# Patient Record
Sex: MALE | Race: WHITE | NOT HISPANIC OR LATINO | Employment: STUDENT | URBAN - METROPOLITAN AREA
[De-identification: names, ages, dates, MRNs, and addresses within clinical notes are randomized per-mention and may not be internally consistent; named-entity substitution may affect disease eponyms.]

---
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
## 2022-04-11 ENCOUNTER — HISTORICAL (OUTPATIENT)
Dept: ADMINISTRATIVE | Facility: HOSPITAL | Age: 22
End: 2022-04-11
Payer: COMMERCIAL

## 2022-04-28 VITALS
DIASTOLIC BLOOD PRESSURE: 60 MMHG | SYSTOLIC BLOOD PRESSURE: 108 MMHG | WEIGHT: 194.69 LBS | HEIGHT: 77 IN | BODY MASS INDEX: 22.99 KG/M2

## 2022-07-31 PROBLEM — Z23 IMMUNIZATION DUE: Status: ACTIVE | Noted: 2022-07-31

## 2022-07-31 PROBLEM — Z00.00 ENCOUNTER FOR WELLNESS EXAMINATION IN ADULT: Status: ACTIVE | Noted: 2022-07-31

## 2022-08-01 PROBLEM — F90.9 ADHD: Status: ACTIVE | Noted: 2022-08-01

## 2022-08-16 ENCOUNTER — ATHLETIC TRAINING SESSION (OUTPATIENT)
Dept: SPORTS MEDICINE | Facility: CLINIC | Age: 22
End: 2022-08-16

## 2022-08-17 ENCOUNTER — OFFICE VISIT (OUTPATIENT)
Dept: SPORTS MEDICINE | Facility: CLINIC | Age: 22
End: 2022-08-17
Payer: COMMERCIAL

## 2022-08-17 ENCOUNTER — HOSPITAL ENCOUNTER (OUTPATIENT)
Dept: CARDIOLOGY | Facility: HOSPITAL | Age: 22
Discharge: HOME | End: 2022-08-17
Attending: ORTHOPAEDIC SURGERY
Payer: COMMERCIAL

## 2022-08-17 VITALS
WEIGHT: 209 LBS | DIASTOLIC BLOOD PRESSURE: 79 MMHG | BODY MASS INDEX: 25.45 KG/M2 | HEIGHT: 76 IN | SYSTOLIC BLOOD PRESSURE: 112 MMHG

## 2022-08-17 VITALS
HEART RATE: 71 BPM | DIASTOLIC BLOOD PRESSURE: 79 MMHG | BODY MASS INDEX: 25.45 KG/M2 | SYSTOLIC BLOOD PRESSURE: 112 MMHG | HEIGHT: 76 IN | WEIGHT: 209 LBS

## 2022-08-17 DIAGNOSIS — R06.09 DOE (DYSPNEA ON EXERTION): ICD-10-CM

## 2022-08-17 DIAGNOSIS — Z86.16 PERSONAL HISTORY OF COVID-19: Primary | ICD-10-CM

## 2022-08-17 DIAGNOSIS — Z09 ENCOUNTER FOR FOLLOW-UP EXAMINATION AFTER COMPLETED TREATMENT FOR CONDITIONS OTHER THAN MALIGNANT NEOPLASM: ICD-10-CM

## 2022-08-17 DIAGNOSIS — Z86.19 PERSONAL HISTORY OF OTHER INFECTIOUS AND PARASITIC DISEASE: ICD-10-CM

## 2022-08-17 DIAGNOSIS — Z86.16 PERSONAL HISTORY OF COVID-19: ICD-10-CM

## 2022-08-17 LAB
ASCENDING AORTA: 2.24 CM
AV INDEX (PROSTH): 1.12
AV MEAN GRADIENT: 2 MMHG
AV PEAK GRADIENT: 3 MMHG
AV VALVE AREA: 3.79 CM2
AV VELOCITY RATIO: 1.2
BSA FOR ECHO PROCEDURE: 2.25 M2
CV ECHO LV RWT: 0.32 CM
DOP CALC AO PEAK VEL: 0.83 M/S
DOP CALC AO VTI: 17.04 CM
DOP CALC LVOT AREA: 3.4 CM2
DOP CALC LVOT DIAMETER: 2.08 CM
DOP CALC LVOT PEAK VEL: 1 M/S
DOP CALC LVOT STROKE VOLUME: 64.63 CM3
DOP CALCLVOT PEAK VEL VTI: 19.03 CM
E WAVE DECELERATION TIME: 244.94 MSEC
E/A RATIO: 1.56
E/E' RATIO: 4.88 M/S
ECHO LV POSTERIOR WALL: 0.77 CM (ref 0.6–1.1)
EJECTION FRACTION: 60 %
FRACTIONAL SHORTENING: 21 % (ref 28–44)
INTERVENTRICULAR SEPTUM: 0.73 CM (ref 0.6–1.1)
IVRT: 59.94 MSEC
LA MAJOR: 4.16 CM
LA MINOR: 4.49 CM
LA WIDTH: 4.11 CM
LEFT ATRIUM SIZE: 3.81 CM
LEFT ATRIUM VOLUME INDEX MOD: 15.4 ML/M2
LEFT ATRIUM VOLUME INDEX: 25.4 ML/M2
LEFT ATRIUM VOLUME MOD: 34.79 CM3
LEFT ATRIUM VOLUME: 57.48 CM3
LEFT INTERNAL DIMENSION IN SYSTOLE: 3.74 CM (ref 2.1–4)
LEFT VENTRICLE DIASTOLIC VOLUME INDEX: 46.5 ML/M2
LEFT VENTRICLE DIASTOLIC VOLUME: 105.09 ML
LEFT VENTRICLE MASS INDEX: 51 G/M2
LEFT VENTRICLE SYSTOLIC VOLUME INDEX: 26.3 ML/M2
LEFT VENTRICLE SYSTOLIC VOLUME: 59.52 ML
LEFT VENTRICULAR INTERNAL DIMENSION IN DIASTOLE: 4.75 CM (ref 3.5–6)
LEFT VENTRICULAR MASS: 114.56 G
LV LATERAL E/E' RATIO: 4.07 M/S
LV SEPTAL E/E' RATIO: 6.1 M/S
MV A" WAVE DURATION": 8.28 MSEC
MV PEAK A VEL: 0.39 M/S
MV PEAK E VEL: 0.61 M/S
MV STENOSIS PRESSURE HALF TIME: 71.03 MS
MV VALVE AREA P 1/2 METHOD: 3.1 CM2
PISA TR MAX VEL: 2.78 M/S
PULM VEIN S/D RATIO: 0.82
PV PEAK D VEL: 0.44 M/S
PV PEAK S VEL: 0.36 M/S
RA MAJOR: 4.13 CM
RA PRESSURE: 3 MMHG
RA WIDTH: 4.11 CM
RV TISSUE DOPPLER FREE WALL SYSTOLIC VELOCITY 1 (APICAL 4 CHAMBER VIEW): 11.05 CM/S
SINUS: 2.85 CM
STJ: 2.38 CM
TDI LATERAL: 0.15 M/S
TDI SEPTAL: 0.1 M/S
TDI: 0.13 M/S
TR MAX PG: 31 MMHG
TRICUSPID ANNULAR PLANE SYSTOLIC EXCURSION: 1.47 CM
TV REST PULMONARY ARTERY PRESSURE: 34 MMHG

## 2022-08-17 PROCEDURE — 93010 ELECTROCARDIOGRAM REPORT: CPT | Mod: S$GLB,,, | Performed by: INTERNAL MEDICINE

## 2022-08-17 PROCEDURE — 3074F PR MOST RECENT SYSTOLIC BLOOD PRESSURE < 130 MM HG: ICD-10-PCS | Mod: CPTII,S$GLB,, | Performed by: ORTHOPAEDIC SURGERY

## 2022-08-17 PROCEDURE — 93010 EKG 12-LEAD: ICD-10-PCS | Mod: S$GLB,,, | Performed by: INTERNAL MEDICINE

## 2022-08-17 PROCEDURE — 93306 TTE W/DOPPLER COMPLETE: CPT | Mod: 26,,, | Performed by: INTERNAL MEDICINE

## 2022-08-17 PROCEDURE — 99999 PR PBB SHADOW E&M-EST. PATIENT-LVL III: CPT | Mod: PBBFAC,,, | Performed by: ORTHOPAEDIC SURGERY

## 2022-08-17 PROCEDURE — 93005 ELECTROCARDIOGRAM TRACING: CPT | Mod: S$GLB,,, | Performed by: ORTHOPAEDIC SURGERY

## 2022-08-17 PROCEDURE — 1159F MED LIST DOCD IN RCRD: CPT | Mod: CPTII,S$GLB,, | Performed by: ORTHOPAEDIC SURGERY

## 2022-08-17 PROCEDURE — 3074F SYST BP LT 130 MM HG: CPT | Mod: CPTII,S$GLB,, | Performed by: ORTHOPAEDIC SURGERY

## 2022-08-17 PROCEDURE — 3078F DIAST BP <80 MM HG: CPT | Mod: CPTII,S$GLB,, | Performed by: ORTHOPAEDIC SURGERY

## 2022-08-17 PROCEDURE — 3008F PR BODY MASS INDEX (BMI) DOCUMENTED: ICD-10-PCS | Mod: CPTII,S$GLB,, | Performed by: ORTHOPAEDIC SURGERY

## 2022-08-17 PROCEDURE — 1159F PR MEDICATION LIST DOCUMENTED IN MEDICAL RECORD: ICD-10-PCS | Mod: CPTII,S$GLB,, | Performed by: ORTHOPAEDIC SURGERY

## 2022-08-17 PROCEDURE — 93306 ECHO (CUPID ONLY): ICD-10-PCS | Mod: 26,,, | Performed by: INTERNAL MEDICINE

## 2022-08-17 PROCEDURE — 99999 PR PBB SHADOW E&M-EST. PATIENT-LVL III: ICD-10-PCS | Mod: PBBFAC,,, | Performed by: ORTHOPAEDIC SURGERY

## 2022-08-17 PROCEDURE — 3078F PR MOST RECENT DIASTOLIC BLOOD PRESSURE < 80 MM HG: ICD-10-PCS | Mod: CPTII,S$GLB,, | Performed by: ORTHOPAEDIC SURGERY

## 2022-08-17 PROCEDURE — 1160F RVW MEDS BY RX/DR IN RCRD: CPT | Mod: CPTII,S$GLB,, | Performed by: ORTHOPAEDIC SURGERY

## 2022-08-17 PROCEDURE — 1160F PR REVIEW ALL MEDS BY PRESCRIBER/CLIN PHARMACIST DOCUMENTED: ICD-10-PCS | Mod: CPTII,S$GLB,, | Performed by: ORTHOPAEDIC SURGERY

## 2022-08-17 PROCEDURE — 93306 TTE W/DOPPLER COMPLETE: CPT

## 2022-08-17 PROCEDURE — 93005 EKG 12-LEAD: ICD-10-PCS | Mod: S$GLB,,, | Performed by: ORTHOPAEDIC SURGERY

## 2022-08-17 PROCEDURE — 99214 PR OFFICE/OUTPT VISIT, EST, LEVL IV, 30-39 MIN: ICD-10-PCS | Mod: S$GLB,,, | Performed by: ORTHOPAEDIC SURGERY

## 2022-08-17 PROCEDURE — 3008F BODY MASS INDEX DOCD: CPT | Mod: CPTII,S$GLB,, | Performed by: ORTHOPAEDIC SURGERY

## 2022-08-17 PROCEDURE — 99214 OFFICE O/P EST MOD 30 MIN: CPT | Mod: S$GLB,,, | Performed by: ORTHOPAEDIC SURGERY

## 2022-08-17 NOTE — PROGRESS NOTES
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  Patient reports having moderate symptoms during his viral course.  Patient appreciates abnormal breathing while throwing. Patient reports paternal grandfather with history of heart surgery.  Patient denies any other known family history of cardiac conditions or early deaths.  Patient denies any known family history of respiratory conditions.  Patient reports personal history of arrhythmia. Patient denies any other personal cardiac or respiratory conditions.     Date of positive test: 6/17/22  Time in isolation: 5 days  Symptoms during viral course: fatigue, difficult breathing, chest pain, headache, body aches, congestion, sore throat, back pain, temperature  Hospitalization required?: no     REVIEW OF SYSTEMS:   Constitution: Patient denies fever or chills.  Eyes: Patient denies eye pain or vision changes.  CVS: Patient denies chest pain.  Lungs: Patient denies shortness of breath or cough.  Abdomen: Patient denies any stomach pain, nausea, vomiting, or diarrhea  Skin: Patient denies skin rash or itching.    Musculoskeletal: Patient denies recent injuries or trauma.  Neuro: Patient denies any numbness or tingling in upper or lower extremities.  Psych: Patient denies any current anxiety or nervousness.     PAST MEDICAL HISTORY:   Past Medical History:   Diagnosis Date    ADHD        PAST SURGICAL HISTORY:  Past Surgical History:   Procedure Laterality Date    ULNAR NERVE TRANSPOSITION          FAMILY HISTORY:  Family History   Problem Relation Age of Onset    Hypertension Mother     Hyperlipidemia Father     No Known Problems Sister     Cancer Maternal Grandfather     Hypertension Paternal Grandfather     Stroke Paternal Grandfather     Congenital heart disease Paternal Grandfather         hole in the heart    Arrhythmia Neg Hx     Early death Neg Hx     Heart attacks under age 50 Neg Hx      Pacemaker/defibrilator Neg Hx        SOCIAL HISTORY:  Social Connections: Not on file       MEDICATIONS:   No current outpatient medications on file.    ALLERGIES:   Review of patient's allergies indicates:  No Known Allergies     PHYSICAL EXAMINATION:  Vitals:    08/17/22 1015   BP: 112/79     Vitals signs and nursing note have been reviewed.  General: In no acute distress, well developed, well nourished, no diaphoresis  Eyes: EOM full and smooth, no eye redness or discharge  HENT: normocephalic and atraumatic, neck supple, trachea midline, no nasal discharge  Cardiovascular:  Regular rate and rhythm, S1 and S2 auscultated, no S3, no S4, no murmurs, no thrills, no JVD, no LE edema, bilateral and symmetric 2+ DP and radial pulses bilaterally  Lungs: respirations non-labored, no conversational dyspnea, CTABL, no wheezing, no rhonchi  Neuro: AAOx3, CN2-12 grossly intact  Skin: No rashes, warm and dry  Psychiatric: cooperative, pleasant, mood and affect appropriate for age       ECG: Sinus bradycardia with sinus arrhythmia, ST elevations consistent with early repolarization, enlarged T-waves from V4 to V6. No Q waves.      ASSESSMENT:    1. Personal history of COVID-19    2. Personal history of other infectious and parasitic disease    3. Encounter for follow-up examination after completed treatment for conditions other than malignant neoplasm    4. HAM (dyspnea on exertion)           PLAN:  1-3. History of COVID-19 - new event     -  Ray is a  for [employer]. Patient previously tested positive for COVID-19 on 6/17/22 and is here to obtain cardiac clearance prior to engaging in athletic activity due to possibility of myocarditis.  During the course of his COVID-19 infection, patient experienced Moderate symptoms. Patient denies known family history of cardiac conditions or early death.      - ECG done in the office today and was personally reviewed by me and with the patient.      - At this time the patient  is mostly asymptomatic, but there are ECG findings that warrant further evaluation. In my opinion it is not yet safe for the patient to start progressing weight lifting and cardiovascular training slowly and under the supervision of the athletic training staff. Education on myocarditis provided today.    - Echocardiogram has been ordered and return to play recommendations pending results.     - ATC has been informed and is on board with the plan.       Future planning includes -  If symptoms exacerbate during return to activity, referral to cardiology, possibly more cardiac testing and labs     All questions were answered to the best of my ability and all concerns were addressed at this time.     Follow up as needed for above. I will remain in close contact with the  to insure no symptoms occur when returning to exercise.        This note is dictated using the M*Modal Fluency Direct word recognition program. There are word recognition mistakes that are occasionally missed on review.    Total time spent face-to face with patient counseling or coordinating care including prognosis, differential diagnosis, risks and benefits of treatment, instructions, compliance risk reductions as well as non-face-to-face time personally spent reviewing medial record, medical documentation, and coordination of care.     EST MINUTES X   99469 10-19    17400 20-29    56443 30-39 X   99215 40-54    NEW     63965 15-29    95997 30-44    82850 45-59    26050 60-74    PHONE      5-10    69177 11-20    66958 21-30

## 2022-08-23 ENCOUNTER — ATHLETIC TRAINING SESSION (OUTPATIENT)
Dept: SPORTS MEDICINE | Facility: CLINIC | Age: 22
End: 2022-08-23

## 2022-10-06 ENCOUNTER — OFFICE VISIT (OUTPATIENT)
Dept: URGENT CARE | Facility: CLINIC | Age: 22
End: 2022-10-06
Payer: COMMERCIAL

## 2022-10-06 VITALS
WEIGHT: 206 LBS | DIASTOLIC BLOOD PRESSURE: 71 MMHG | SYSTOLIC BLOOD PRESSURE: 132 MMHG | HEART RATE: 66 BPM | OXYGEN SATURATION: 97 % | HEIGHT: 77 IN | RESPIRATION RATE: 20 BRPM | BODY MASS INDEX: 24.32 KG/M2 | TEMPERATURE: 99 F

## 2022-10-06 DIAGNOSIS — R11.2 NAUSEA AND VOMITING, UNSPECIFIED VOMITING TYPE: ICD-10-CM

## 2022-10-06 DIAGNOSIS — A08.4 VIRAL GASTROENTERITIS: Primary | ICD-10-CM

## 2022-10-06 PROCEDURE — 99213 OFFICE O/P EST LOW 20 MIN: CPT | Mod: 25,S$GLB,, | Performed by: NURSE PRACTITIONER

## 2022-10-06 PROCEDURE — 3075F PR MOST RECENT SYSTOLIC BLOOD PRESS GE 130-139MM HG: ICD-10-PCS | Mod: CPTII,S$GLB,, | Performed by: NURSE PRACTITIONER

## 2022-10-06 PROCEDURE — 3078F PR MOST RECENT DIASTOLIC BLOOD PRESSURE < 80 MM HG: ICD-10-PCS | Mod: CPTII,S$GLB,, | Performed by: NURSE PRACTITIONER

## 2022-10-06 PROCEDURE — 96372 THER/PROPH/DIAG INJ SC/IM: CPT | Mod: S$GLB,,, | Performed by: NURSE PRACTITIONER

## 2022-10-06 PROCEDURE — 99213 PR OFFICE/OUTPT VISIT, EST, LEVL III, 20-29 MIN: ICD-10-PCS | Mod: 25,S$GLB,, | Performed by: NURSE PRACTITIONER

## 2022-10-06 PROCEDURE — 1159F PR MEDICATION LIST DOCUMENTED IN MEDICAL RECORD: ICD-10-PCS | Mod: CPTII,S$GLB,, | Performed by: NURSE PRACTITIONER

## 2022-10-06 PROCEDURE — 3008F BODY MASS INDEX DOCD: CPT | Mod: CPTII,S$GLB,, | Performed by: NURSE PRACTITIONER

## 2022-10-06 PROCEDURE — 1159F MED LIST DOCD IN RCRD: CPT | Mod: CPTII,S$GLB,, | Performed by: NURSE PRACTITIONER

## 2022-10-06 PROCEDURE — 3075F SYST BP GE 130 - 139MM HG: CPT | Mod: CPTII,S$GLB,, | Performed by: NURSE PRACTITIONER

## 2022-10-06 PROCEDURE — 3008F PR BODY MASS INDEX (BMI) DOCUMENTED: ICD-10-PCS | Mod: CPTII,S$GLB,, | Performed by: NURSE PRACTITIONER

## 2022-10-06 PROCEDURE — 3078F DIAST BP <80 MM HG: CPT | Mod: CPTII,S$GLB,, | Performed by: NURSE PRACTITIONER

## 2022-10-06 PROCEDURE — 96372 PR INJECTION,THERAP/PROPH/DIAG2ST, IM OR SUBCUT: ICD-10-PCS | Mod: S$GLB,,, | Performed by: NURSE PRACTITIONER

## 2022-10-06 RX ORDER — ONDANSETRON 4 MG/1
4 TABLET, ORALLY DISINTEGRATING ORAL EVERY 8 HOURS PRN
Qty: 15 TABLET | Refills: 0 | Status: SHIPPED | OUTPATIENT
Start: 2022-10-06 | End: 2022-11-30

## 2022-10-06 RX ORDER — ONDANSETRON 2 MG/ML
4 INJECTION INTRAMUSCULAR; INTRAVENOUS
Status: COMPLETED | OUTPATIENT
Start: 2022-10-06 | End: 2022-10-06

## 2022-10-06 RX ADMIN — ONDANSETRON 4 MG: 2 INJECTION INTRAMUSCULAR; INTRAVENOUS at 10:10

## 2022-10-06 NOTE — PROGRESS NOTES
"Subjective:       Patient ID: Ray Lozano is a 22 y.o. male.    Vitals:  height is 6' 5" (1.956 m) and weight is 93.4 kg (206 lb). His oral temperature is 98.9 °F (37.2 °C). His blood pressure is 132/71 and his pulse is 66. His respiration is 20 and oxygen saturation is 97%.     Chief Complaint: Emesis    This is a 22 y.o. male who presents today with a chief complaint of  nausea and vomiting.  Patient said that he ate something yesterday and started vomiting afterwards. Patient says that he has been vomiting  about every 30 mins. Since 7:30 yesterday. Patient can't keep down any thing down.         Emesis   This is a new problem. The current episode started yesterday. The problem occurs 5 to 10 times per day. The problem has been unchanged. The emesis has an appearance of bile. There has been no fever. Associated symptoms include abdominal pain, chills and sweats. Risk factors include suspect food intake. Treatments tried: Zofran. The treatment provided no relief.     Constitution: Positive for chills.   Gastrointestinal:  Positive for abdominal pain and vomiting.     Objective:      Physical Exam   HENT:   Head: Normocephalic and atraumatic.   Ears:   Right Ear: Tympanic membrane, external ear and ear canal normal.   Left Ear: Tympanic membrane, external ear and ear canal normal.   Nose: Nose normal.   Pulmonary/Chest: Effort normal.   Abdominal: Normal appearance and bowel sounds are normal. He exhibits no distension and no mass. Soft. There is no abdominal tenderness. There is no rebound, no guarding, no left CVA tenderness and no right CVA tenderness. No hernia.   Neurological: He is alert.   Skin: Capillary refill takes less than 2 seconds.   Nursing note and vitals reviewed.        Assessment:       1. Viral gastroenteritis    2. Nausea and vomiting, unspecified vomiting type          Plan:         Viral gastroenteritis    Nausea and vomiting, unspecified vomiting type  -     ondansetron injection 4 " mg  -     ondansetron (ZOFRAN-ODT) 4 MG TbDL; Take 1 tablet (4 mg total) by mouth every 8 (eight) hours as needed (nausea and vomiting).  Dispense: 15 tablet; Refill: 0               Patient Instructions   Nausea & Vomiting  If your condition worsens or fails to improve we recommend that you receive another evaluation at the ER immediately or contact your PCP to discuss your concerns or return here. You must understand that you've received an urgent care treatment only and that you may be released before all your medical problems are known or treated. You the patient will arrange for followup care as instructed.   Use prescribed anti-nausea medicine as needed and prescribed   Increase fluids and rest is important   Start off with liquid diet and progress as tolerated (see below)  Water and clear liquids are important so you do not get dehydrated. Drink a small amount at a time.  Do not force yourself to eat, especially if you have cramps, vomiting, or diarrhea. When you finally decide to start eating, do not eat large amounts at a time, even if you are hungry.  If you eat, avoid fatty, greasy, spicy, or fried foods.  Do not eat dairy products if you have diarrhea; they can make the diarrhea worse  Watch for any increase pain, fever, localized pain to right lower abdomen or continued vomiting or diarrhea.

## 2022-10-06 NOTE — PATIENT INSTRUCTIONS
Nausea & Vomiting  If your condition worsens or fails to improve we recommend that you receive another evaluation at the ER immediately or contact your PCP to discuss your concerns or return here. You must understand that you've received an urgent care treatment only and that you may be released before all your medical problems are known or treated. You the patient will arrange for followup care as instructed.   Use prescribed anti-nausea medicine as needed and prescribed   Increase fluids and rest is important   Start off with liquid diet and progress as tolerated (see below)  Water and clear liquids are important so you do not get dehydrated. Drink a small amount at a time.  Do not force yourself to eat, especially if you have cramps, vomiting, or diarrhea. When you finally decide to start eating, do not eat large amounts at a time, even if you are hungry.  If you eat, avoid fatty, greasy, spicy, or fried foods.  Do not eat dairy products if you have diarrhea; they can make the diarrhea worse  Watch for any increase pain, fever, localized pain to right lower abdomen or continued vomiting or diarrhea.

## 2022-10-13 ENCOUNTER — ATHLETIC TRAINING SESSION (OUTPATIENT)
Dept: SPORTS MEDICINE | Facility: CLINIC | Age: 22
End: 2022-10-13
Payer: COMMERCIAL

## 2022-10-24 NOTE — PROGRESS NOTES
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 He reports he went to the ER last night because he was feeling so sick and couldn't keep anything down. He reports he was diagnosed there with gastroenteritis and colitis. He was given medicine to take to relieve symptoms, but he still is struggling to hold anything down.      Plan     Plan to continue meds and hold from practice until he starts feeling better. SA reports feeling very weak and fatigued, so he will need to build into practice again once he feels better. Athlete is cleared to come to practice, but shouldn't participate until he starts feeling stronger and more fueled.         Pablito REESE, LAT, ATC, NEYMAR    [employer] [facility]

## 2022-10-25 NOTE — PROGRESS NOTES
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    ATC administered fascial cupping for the patient.    Patient Reports Relief of Symptoms After Treatment: Yes    Plan to treat as needed.        LEONARD Barrios, ATC, [facility]    [employer] [facility]

## 2022-10-25 NOTE — PROGRESS NOTES
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    ATC administered fascial cupping for the patient.    Patient Reports Relief of Symptoms After Treatment: Yes    Plan to treat as needed.         LEONARD Barrios, ATC, [facility]    [employer] [facility]

## 2022-10-31 ENCOUNTER — ATHLETIC TRAINING SESSION (OUTPATIENT)
Dept: SPORTS MEDICINE | Facility: CLINIC | Age: 22
End: 2022-10-31

## 2022-10-31 PROBLEM — Z00.00 ENCOUNTER FOR WELLNESS EXAMINATION IN ADULT: Status: RESOLVED | Noted: 2022-07-31 | Resolved: 2022-10-31

## 2022-10-31 NOTE — PROGRESS NOTES
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    ATC administered fascial cupping for the patient.    Patient Reports Relief of Symptoms After Treatment: Yes    Plan to treat as needed.       Pablito REESE, LEONARD, ATC, [facility]    [employer] [facility] [place]

## 2022-11-30 PROBLEM — R11.15 CYCLICAL VOMITING: Status: ACTIVE | Noted: 2022-11-30

## 2022-11-30 PROBLEM — R63.4 WEIGHT LOSS: Status: ACTIVE | Noted: 2022-11-30

## 2022-11-30 PROCEDURE — 83690 ASSAY OF LIPASE: CPT | Performed by: FAMILY MEDICINE

## 2022-12-28 ENCOUNTER — LAB VISIT (OUTPATIENT)
Dept: LAB | Facility: HOSPITAL | Age: 22
End: 2022-12-28
Attending: STUDENT IN AN ORGANIZED HEALTH CARE EDUCATION/TRAINING PROGRAM
Payer: COMMERCIAL

## 2022-12-28 DIAGNOSIS — R11.0 NAUSEA: Primary | ICD-10-CM

## 2022-12-28 LAB
ERYTHROCYTE [DISTWIDTH] IN BLOOD BY AUTOMATED COUNT: 12.5 % (ref 11.6–14.4)
HCT VFR BLD AUTO: 39.7 % (ref 42–52)
HGB BLD-MCNC: 13.3 GM/DL (ref 14–18)
MCH RBC QN AUTO: 31.3 PG
MCHC RBC AUTO-ENTMCNC: 33.5 MG/DL (ref 33–36)
MCV RBC AUTO: 93.4 FL (ref 80–94)
NRBC BLD AUTO-RTO: 0 % (ref 0–1)
PLATELET # BLD AUTO: 268 X10(3)/MCL (ref 140–371)
PMV BLD AUTO: 9.7 FL (ref 9.4–12.4)
RBC # BLD AUTO: 4.25 X10(6)/MCL (ref 4.7–6.1)
WBC # SPEC AUTO: 6.8 X10(3)/MCL (ref 4.5–11.5)

## 2022-12-28 PROCEDURE — 85027 COMPLETE CBC AUTOMATED: CPT

## 2022-12-28 PROCEDURE — 36415 COLL VENOUS BLD VENIPUNCTURE: CPT

## 2023-02-17 ENCOUNTER — ATHLETIC TRAINING SESSION (OUTPATIENT)
Dept: SPORTS MEDICINE | Facility: CLINIC | Age: 23
End: 2023-02-17
Payer: COMMERCIAL

## 2023-03-03 NOTE — PROGRESS NOTES
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 He reports feeling pretty bad again overall.    Plan     Plan to continue pushing hydration, sleep, Pepto for symptoms, and light, dry foods when he can hold it down. Plan to follow up with his gastro doctor once we return to [facility].  ______________________________________________________________________    Visit Date: 2/18/2023    Affected Area: Illness  Initial Injury Date: 2/16/2023      Subjective     Student-athlete reports: That he is feeling much better, just empty and weak. He reports he is nervous to try and eat a lot to avoid getting sick again.    Reports he threw a light bullpen to keep his arm moving and it went okay.    Plan     Plan to continue pushing hydration, sleep, Pepto for symptoms, and light, dry foods when he can hold it down. Will continue to monitor, but he will not play this weekend.   ______________________________________________________________________    Visit Date: 2/17/2023    Affected Area: Illness  Initial Injury Date: 2/16/2023      Subjective     Student-athlete reports: That last night, he started feeling sick on the bus. He reports he thinks it was triggered after he at a spicy sandwich from [employer]. He admits he has been vomiting the whole bus ride, much of last night and this morning.      He reports in the afternoon that he feels a little bit better, but not great. He hasn't thrown up in several hours, but still feels nauseous and weak.      Plan     Plan to continue pushing hydration, sleep, Pepto for symptoms, and light,  dry foods when he can hold it down. Will continue to monitor, but he will not play this weekend.         Pablito REESE, LAT, ATC, NEYMAR    [employer] [facility] [place]

## 2023-03-24 ENCOUNTER — ATHLETIC TRAINING SESSION (OUTPATIENT)
Dept: SPORTS MEDICINE | Facility: CLINIC | Age: 23
End: 2023-03-24
Payer: COMMERCIAL

## 2023-03-24 NOTE — PROGRESS NOTES
[facility]       Weekly Treatment Log       Name: Ray Lozano  Clinic Number: [id number]  Sport: Men's Baseball    ___________________________________________________________    Visit Date: 3/23/2023    Reason For Visit: Soreness/Recovery  Affected Area: Right Shoulder      Notes     Student-athlete reports soreness in his right shoulder after throwing on 3/21 and the bus today.    ATC administered fascial cupping for the patient.    Patient Reports Relief of Symptoms After Treatment: Yes    Plan to treat as needed         Pablito REESE, LEONARD, ATC, [facility]    [employer] [facility]

## 2023-08-24 ENCOUNTER — OFFICE VISIT (OUTPATIENT)
Dept: SPORTS MEDICINE | Facility: CLINIC | Age: 23
End: 2023-08-24
Payer: COMMERCIAL

## 2023-08-24 ENCOUNTER — LAB VISIT (OUTPATIENT)
Dept: LAB | Facility: HOSPITAL | Age: 23
End: 2023-08-24
Attending: ORTHOPAEDIC SURGERY
Payer: COMMERCIAL

## 2023-08-24 VITALS
HEIGHT: 77 IN | DIASTOLIC BLOOD PRESSURE: 70 MMHG | SYSTOLIC BLOOD PRESSURE: 110 MMHG | WEIGHT: 186 LBS | BODY MASS INDEX: 21.96 KG/M2

## 2023-08-24 DIAGNOSIS — R11.2 NAUSEA AND VOMITING, UNSPECIFIED VOMITING TYPE: ICD-10-CM

## 2023-08-24 DIAGNOSIS — R53.83 FATIGUE, UNSPECIFIED TYPE: ICD-10-CM

## 2023-08-24 DIAGNOSIS — R11.2 NAUSEA AND VOMITING, UNSPECIFIED VOMITING TYPE: Primary | ICD-10-CM

## 2023-08-24 LAB
ALBUMIN SERPL BCP-MCNC: 4.2 G/DL (ref 3.5–5.2)
ALP SERPL-CCNC: 59 U/L (ref 55–135)
ALT SERPL W/O P-5'-P-CCNC: 16 U/L (ref 10–44)
AMYLASE SERPL-CCNC: 54 U/L (ref 20–110)
ANION GAP SERPL CALC-SCNC: 12 MMOL/L (ref 8–16)
AST SERPL-CCNC: 15 U/L (ref 10–40)
BASOPHILS # BLD AUTO: 0.05 K/UL (ref 0–0.2)
BASOPHILS NFR BLD: 0.9 % (ref 0–1.9)
BILIRUB SERPL-MCNC: 0.5 MG/DL (ref 0.1–1)
BUN SERPL-MCNC: 12 MG/DL (ref 6–20)
CALCIUM SERPL-MCNC: 9.5 MG/DL (ref 8.7–10.5)
CHLORIDE SERPL-SCNC: 107 MMOL/L (ref 95–110)
CO2 SERPL-SCNC: 23 MMOL/L (ref 23–29)
CREAT SERPL-MCNC: 1.4 MG/DL (ref 0.5–1.4)
DIFFERENTIAL METHOD: ABNORMAL
EOSINOPHIL # BLD AUTO: 0.2 K/UL (ref 0–0.5)
EOSINOPHIL NFR BLD: 4 % (ref 0–8)
ERYTHROCYTE [DISTWIDTH] IN BLOOD BY AUTOMATED COUNT: 12.3 % (ref 11.5–14.5)
EST. GFR  (NO RACE VARIABLE): >60 ML/MIN/1.73 M^2
FERRITIN SERPL-MCNC: 224 NG/ML (ref 20–300)
GLUCOSE SERPL-MCNC: 92 MG/DL (ref 70–110)
HCT VFR BLD AUTO: 40 % (ref 40–54)
HGB BLD-MCNC: 13.4 G/DL (ref 14–18)
IMM GRANULOCYTES # BLD AUTO: 0.02 K/UL (ref 0–0.04)
IMM GRANULOCYTES NFR BLD AUTO: 0.4 % (ref 0–0.5)
IRON SERPL-MCNC: 93 UG/DL (ref 45–160)
LYMPHOCYTES # BLD AUTO: 2.1 K/UL (ref 1–4.8)
LYMPHOCYTES NFR BLD: 37.7 % (ref 18–48)
MCH RBC QN AUTO: 31.6 PG (ref 27–31)
MCHC RBC AUTO-ENTMCNC: 33.5 G/DL (ref 32–36)
MCV RBC AUTO: 94 FL (ref 82–98)
MONOCYTES # BLD AUTO: 0.5 K/UL (ref 0.3–1)
MONOCYTES NFR BLD: 8.2 % (ref 4–15)
NEUTROPHILS # BLD AUTO: 2.7 K/UL (ref 1.8–7.7)
NEUTROPHILS NFR BLD: 48.8 % (ref 38–73)
NRBC BLD-RTO: 0 /100 WBC
PLATELET # BLD AUTO: 266 K/UL (ref 150–450)
PMV BLD AUTO: 9.6 FL (ref 9.2–12.9)
POTASSIUM SERPL-SCNC: 4.2 MMOL/L (ref 3.5–5.1)
PROT SERPL-MCNC: 6.8 G/DL (ref 6–8.4)
RBC # BLD AUTO: 4.24 M/UL (ref 4.6–6.2)
SATURATED IRON: 27 % (ref 20–50)
SODIUM SERPL-SCNC: 142 MMOL/L (ref 136–145)
TOTAL IRON BINDING CAPACITY: 348 UG/DL (ref 250–450)
TRANSFERRIN SERPL-MCNC: 235 MG/DL (ref 200–375)
WBC # BLD AUTO: 5.52 K/UL (ref 3.9–12.7)

## 2023-08-24 PROCEDURE — 84466 ASSAY OF TRANSFERRIN: CPT | Performed by: ORTHOPAEDIC SURGERY

## 2023-08-24 PROCEDURE — 3008F BODY MASS INDEX DOCD: CPT | Mod: CPTII,S$GLB,, | Performed by: ORTHOPAEDIC SURGERY

## 2023-08-24 PROCEDURE — 82728 ASSAY OF FERRITIN: CPT | Performed by: ORTHOPAEDIC SURGERY

## 2023-08-24 PROCEDURE — 85025 COMPLETE CBC W/AUTO DIFF WBC: CPT | Performed by: ORTHOPAEDIC SURGERY

## 2023-08-24 PROCEDURE — 3078F PR MOST RECENT DIASTOLIC BLOOD PRESSURE < 80 MM HG: ICD-10-PCS | Mod: CPTII,S$GLB,, | Performed by: ORTHOPAEDIC SURGERY

## 2023-08-24 PROCEDURE — 3008F PR BODY MASS INDEX (BMI) DOCUMENTED: ICD-10-PCS | Mod: CPTII,S$GLB,, | Performed by: ORTHOPAEDIC SURGERY

## 2023-08-24 PROCEDURE — 99999 PR PBB SHADOW E&M-EST. PATIENT-LVL III: ICD-10-PCS | Mod: PBBFAC,,, | Performed by: ORTHOPAEDIC SURGERY

## 2023-08-24 PROCEDURE — 83540 ASSAY OF IRON: CPT | Performed by: ORTHOPAEDIC SURGERY

## 2023-08-24 PROCEDURE — 1159F MED LIST DOCD IN RCRD: CPT | Mod: CPTII,S$GLB,, | Performed by: ORTHOPAEDIC SURGERY

## 2023-08-24 PROCEDURE — 80053 COMPREHEN METABOLIC PANEL: CPT | Performed by: ORTHOPAEDIC SURGERY

## 2023-08-24 PROCEDURE — 99215 PR OFFICE/OUTPT VISIT, EST, LEVL V, 40-54 MIN: ICD-10-PCS | Mod: S$GLB,,, | Performed by: ORTHOPAEDIC SURGERY

## 2023-08-24 PROCEDURE — 99215 OFFICE O/P EST HI 40 MIN: CPT | Mod: S$GLB,,, | Performed by: ORTHOPAEDIC SURGERY

## 2023-08-24 PROCEDURE — 1159F PR MEDICATION LIST DOCUMENTED IN MEDICAL RECORD: ICD-10-PCS | Mod: CPTII,S$GLB,, | Performed by: ORTHOPAEDIC SURGERY

## 2023-08-24 PROCEDURE — 3074F PR MOST RECENT SYSTOLIC BLOOD PRESSURE < 130 MM HG: ICD-10-PCS | Mod: CPTII,S$GLB,, | Performed by: ORTHOPAEDIC SURGERY

## 2023-08-24 PROCEDURE — 3078F DIAST BP <80 MM HG: CPT | Mod: CPTII,S$GLB,, | Performed by: ORTHOPAEDIC SURGERY

## 2023-08-24 PROCEDURE — 1160F PR REVIEW ALL MEDS BY PRESCRIBER/CLIN PHARMACIST DOCUMENTED: ICD-10-PCS | Mod: CPTII,S$GLB,, | Performed by: ORTHOPAEDIC SURGERY

## 2023-08-24 PROCEDURE — 36415 COLL VENOUS BLD VENIPUNCTURE: CPT | Mod: PN | Performed by: ORTHOPAEDIC SURGERY

## 2023-08-24 PROCEDURE — 99999 PR PBB SHADOW E&M-EST. PATIENT-LVL III: CPT | Mod: PBBFAC,,, | Performed by: ORTHOPAEDIC SURGERY

## 2023-08-24 PROCEDURE — 82150 ASSAY OF AMYLASE: CPT | Performed by: ORTHOPAEDIC SURGERY

## 2023-08-24 PROCEDURE — 1160F RVW MEDS BY RX/DR IN RCRD: CPT | Mod: CPTII,S$GLB,, | Performed by: ORTHOPAEDIC SURGERY

## 2023-08-24 PROCEDURE — 3074F SYST BP LT 130 MM HG: CPT | Mod: CPTII,S$GLB,, | Performed by: ORTHOPAEDIC SURGERY

## 2023-08-24 RX ORDER — HYDROXYZINE PAMOATE 25 MG/1
25 CAPSULE ORAL 4 TIMES DAILY
Qty: 60 CAPSULE | Refills: 0 | Status: SHIPPED | OUTPATIENT
Start: 2023-08-24 | End: 2023-09-22 | Stop reason: SDUPTHER

## 2023-08-24 NOTE — LETTER
August 24, 2023      [facility]   [address], [address]  [place] [address]  Phone: [phone]  Fax: [phone]       Patient: Ray Lozano   YOB: 2000  Date of Visit: 08/24/2023    To Whom It May Concern:    MIRELLA Lozano  was at [facility] on 08/24/2023. Please excuse him from class missed today due to his appointment. If you have any questions or concerns, or if I can be of further assistance, please do not hesitate to contact me.    Sincerely,      Dr. John Harrington, DO

## 2023-08-24 NOTE — PROGRESS NOTES
"CC: nausea/vomiting     23 y.o. Male presents today for initial evaluation of his nausea/vomiting. 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 He has a personal history of nausea/vomiting that occur intermittently and episodes will be severe. He recalls three occurrences over the summer, and his most recent 08/18/2023. He states he will have to vomit "until everything is up." He states he will occasionally see small amounts of blood in his vomit. He admits to prior history of endoscopy while at home in [place], but states findings were normal. He has been consistently taking Protonix 40 mg for 2 months without relief.  He denies any family history of gallbladder disease or liver disease.  He also admits to feeling more worn down and fatigued than usual.  Some foods he is able to eat without issue, other foods will consistently cause him to throw up (Chik leandra A)    Occupation: Student athlete - [employer]    PAST MEDICAL HISTORY:   Past Medical History:   Diagnosis Date    ADHD        PAST SURGICAL HISTORY:  Past Surgical History:   Procedure Laterality Date    ULNAR NERVE TRANSPOSITION         FAMILY HISTORY:  Family History   Problem Relation Age of Onset    Hypertension Mother     Hyperlipidemia Father     No Known Problems Sister     Cancer Maternal Grandfather     Hypertension Paternal Grandfather     Stroke Paternal Grandfather     Congenital heart disease Paternal Grandfather         hole in the heart    Arrhythmia Neg Hx     Early death Neg Hx     Heart attacks under age 50 Neg Hx     Pacemaker/defibrilator Neg Hx        SOCIAL HISTORY:  Social History     Socioeconomic History    Marital status: Single    Number of children: 0   Occupational History    Occupation: Student   Tobacco Use    Smoking status: Never    Smokeless tobacco: Current     Types: Chew   Substance and Sexual Activity    Alcohol use: Not Currently    Drug use: Yes     Types: Marijuana    Sexual activity: Yes       MEDICATIONS:     Current Outpatient " "Medications:     hydrOXYzine pamoate (VISTARIL) 25 MG Cap, Take 1 capsule (25 mg total) by mouth 4 (four) times daily., Disp: 60 capsule, Rfl: 0    pantoprazole (PROTONIX) 40 MG tablet, TAKE 1 TABLET BY MOUTH EVERY DAY, Disp: 30 tablet, Rfl: 5    ALLERGIES:   Review of patient's allergies indicates:  No Known Allergies     PHYSICAL EXAMINATION:  /70   Ht 6' 5.01" (1.956 m)   Wt 84.4 kg (186 lb)   BMI 22.05 kg/m²   Vitals signs and nursing note have been reviewed.  General: In no acute distress, well developed, well nourished, no diaphoresis  Eyes: EOM full and smooth, no eye redness or discharge  HENT: normocephalic and atraumatic, neck supple, trachea midline, no nasal discharge  Cardiovascular: no LE edema  Lungs: respirations non-labored, no conversational dyspnea   Abd: soft, non distended, mild tenderness to palpation in the upper right and upper left quadrants, mild tenderness in the epigastric region, no rigidity, no guarding  Neuro: AAOx3, CN2-12 grossly intact  Skin: No rashes, warm and dry  Psychiatric: cooperative, pleasant, mood and affect appropriate for age      ASSESSMENT:      ICD-10-CM ICD-9-CM   1. Nausea and vomiting, unspecified vomiting type  R11.2 787.01   2. Fatigue, unspecified type  R53.83 780.79         PLAN:  1-2.  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 He has a prior history of endoscopy which resulted normally.     - Prior GI studies were reviewed, including an upper endoscopy which did not reveal any cause of his persistent symptoms nor other concerning findings.    - Unknown cause of persistent vomiting.  Differential includes intra-abdominal pathology, liver disorder, gallbladder disorder, possibly allergy/sensitivity to certain foods coupled with psychological component.  All these discussed with patient and further " workup is necessary.    - Labs to be obtained today - CBC, CMP, amylase, ferritin, iron and TIBC, hepatic function panel    - Abdominal ultrasound and CT abdomen ordered to assess intra-abdominal structures.  Last CT from fall 2022 was following viral gastroenteritis and symptoms were different at that time.  Fat stranding was noted on prior CT scan and further evaluating this is necessary.    - Upper GI series ordered and scheduled.  If any abnormalities are found and cause of nausea/vomiting is identified with the above labs and imaging studies, we will cancel this test.    - Vistaril 25 mg 3-4 times daily prescribed and could help with multiple causes of symptoms.    - Contact has been made with their  who is on board with the plan.       Future planning includes - next steps pending labs, imaging, possible return to GI specialist    All questions were answered to the best of my ability and all concerns were addressed at this time.    Follow up after labs/imaging.      This note is dictated using the M*Modal Fluency Direct word recognition program. There are word recognition mistakes that are occasionally missed on review.      Total time spent face-to face with patient counseling or coordinating care including prognosis, differential diagnosis, risks and benefits of treatment, instructions, compliance risk reductions as well as non-face-to-face time personally spent reviewing medial record, medical documentation, and coordination of care.     EST MINUTES X   27257 10-19    54220 20-29    89085 30-39    62737 40-54 X   NEW     45435 15-29    86276 30-44    34257 45-59    81153 60-74    PHONE      5-10    [id number] 11-20    07075 21-30

## 2023-08-31 ENCOUNTER — ATHLETIC TRAINING SESSION (OUTPATIENT)
Dept: SPORTS MEDICINE | Facility: CLINIC | Age: 23
End: 2023-08-31

## 2023-09-01 ENCOUNTER — HOSPITAL ENCOUNTER (OUTPATIENT)
Dept: RADIOLOGY | Facility: HOSPITAL | Age: 23
Discharge: HOME | End: 2023-09-01
Attending: ORTHOPAEDIC SURGERY
Payer: COMMERCIAL

## 2023-09-01 DIAGNOSIS — R11.2 NAUSEA AND VOMITING, UNSPECIFIED VOMITING TYPE: ICD-10-CM

## 2023-09-01 PROCEDURE — 74150 CT ABDOMEN WITHOUT CONTRAST: ICD-10-PCS | Mod: 26,,, | Performed by: RADIOLOGY

## 2023-09-01 PROCEDURE — 74150 CT ABDOMEN W/O CONTRAST: CPT | Mod: 26,,, | Performed by: RADIOLOGY

## 2023-09-01 PROCEDURE — 76700 US ABDOMEN COMPLETE: ICD-10-PCS | Mod: 26,,, | Performed by: RADIOLOGY

## 2023-09-01 PROCEDURE — 76700 US EXAM ABDOM COMPLETE: CPT | Mod: TC

## 2023-09-01 PROCEDURE — 76700 US EXAM ABDOM COMPLETE: CPT | Mod: 26,,, | Performed by: RADIOLOGY

## 2023-09-01 PROCEDURE — 74150 CT ABDOMEN W/O CONTRAST: CPT | Mod: TC

## 2023-09-22 DIAGNOSIS — R11.2 NAUSEA AND VOMITING, UNSPECIFIED VOMITING TYPE: ICD-10-CM

## 2023-09-22 RX ORDER — HYDROXYZINE PAMOATE 25 MG/1
25 CAPSULE ORAL 4 TIMES DAILY
Qty: 60 CAPSULE | Refills: 2 | Status: SHIPPED | OUTPATIENT
Start: 2023-09-22 | End: 2023-10-16 | Stop reason: SDUPTHER

## 2023-10-16 DIAGNOSIS — R11.2 NAUSEA AND VOMITING, UNSPECIFIED VOMITING TYPE: ICD-10-CM

## 2023-10-16 RX ORDER — HYDROXYZINE PAMOATE 25 MG/1
25 CAPSULE ORAL 4 TIMES DAILY
Qty: 60 CAPSULE | Refills: 2 | Status: SHIPPED | OUTPATIENT
Start: 2023-10-16

## 2023-10-16 RX ORDER — HYDROXYZINE PAMOATE 25 MG/1
25 CAPSULE ORAL 4 TIMES DAILY
Qty: 60 CAPSULE | Refills: 2 | Status: SHIPPED | OUTPATIENT
Start: 2023-10-16 | End: 2023-10-16

## 2023-12-27 ENCOUNTER — ATHLETIC TRAINING SESSION (OUTPATIENT)
Dept: SPORTS MEDICINE | Facility: CLINIC | Age: 23
End: 2023-12-27
Payer: COMMERCIAL

## 2023-12-27 DIAGNOSIS — M25.572 ACUTE LEFT ANKLE PAIN: Primary | ICD-10-CM

## 2023-12-27 NOTE — PROGRESS NOTES
Subjective:       Chief Complaint: Ray Lozano is a 23 y.o. male student at [employer] who had concerns including Pain of the Left Ankle.    Ray reports that on 12/21/23 he stepped off a step and rolled his ankle. He reported hearing and feeling a pop. He used a compression wrap and was icing it in the mean time. Swelling has gone down considerably. He has always been able to weight bear with minimal pain.     Handedness: right-handed  Sport played:      Level:          Ray also participates in baseball.  Pain        ROS              Objective:       General: Ray is well-developed, well-nourished, appears stated age, in no acute distress, alert and oriented to time, place and person.         General Musculoskeletal Exam   Gait: normal     Right Ankle/Foot Exam   Right ankle exam is normal.    Left Ankle/Foot Exam     Inspection  Bruising: Ankle - present   Effusion: Ankle - present     Pain   The patient exhibits pain of the anterior talofibular ligament.    Swelling   The patient is swollen on the anterior talofibular ligament, calcaneofibular ligament and lateral malleolus.    Tenderness   The patient is tender to palpation of the lateral malleolus.    Muscle Strength   The patient has normal left ankle strength.    Tests   Anterior drawer: negative  Varus tilt: negative  External Rotation Test: negative  Squeeze Test: absent    Other   Sensation: normal              Assessment:     Status: AT - Cleared to Exert    Date Seen:  12/27/23    Date of Injury:  12/21/23    Date Out:  n/a    Date Cleared:  12/27/23      Plan:       1. Compression, strengthening as tolerated  2. Physician Referral: no  3. ED Referral: no  4. Parent/Guardian Notified: No  5. All questions were answered, ath. will contact me for questions or concerns in  the interim.  6.         Eligible to use School Insurance: No, not a school related injury        RAY completed:    [x] INJURY TREATMENT    []MAINTENANCE  DATE OF SERVICE:  12/27/23   INJURY/CONDITON: left ankle sprain    TRAE reports feeling better and having no pain. He states he has limited ROM. TRAE received the selected modalities after being cleared for contradictions.  TRAE received education on potenital side effects of the selected modalities and agreed to treatment.      THERAPEUTIC EXERCISES:    Stretching  Cardio Rehab   [x]Stretching - Active  []Cardio - Bike [x]Rehab - Ankle/Foot   []Stretching - Dynamic  []Cardio - Elliptical []Rehab - Knee   []Stretching - Passive  []Cardio - Jog/Run []Rehab - Hip   []Stretching - PNF  []Cardio - Treadmill []Rehab - Wrist/Hand   []Stretching - Static  []Cardio - Upper Body Ergometer []Rehab - Elbow     []Cardio - Walk []Rehab - Shoulder      []Rehab - Neck/Spine      []Rehab - Back      []Rehab - Other     Comment:     Exercise Reps/Sets/Time Weight/Band   4 way ankle 3x10 green   SL calf raise 3x10    Calf stretch [id number]    Clams 3x10 blue   SL Balance [id number] airex                              Comment:    Massage Duration  (Mins) Add. Tx Parameters / Comment   []Massage - IASTM     [x]Massage - Therapeutic 5 Milking massage   []Myofascial/Active Release      []Cupping

## 2024-01-10 ENCOUNTER — ATHLETIC TRAINING SESSION (OUTPATIENT)
Dept: SPORTS MEDICINE | Facility: CLINIC | Age: 24
End: 2024-01-10
Payer: COMMERCIAL

## 2024-01-10 DIAGNOSIS — M25.572 ACUTE LEFT ANKLE PAIN: Primary | ICD-10-CM

## 2024-01-10 NOTE — PROGRESS NOTES
Subjective:       Chief Complaint: Ray Lozano is a 23 y.o. male student at [employer] who had concerns including Pain of the Left Ankle.    Ray reports that on 12/21/23 he stepped off a step and rolled his ankle. He reported hearing and feeling a pop. He used a compression wrap and was icing it in the mean time. Swelling has gone down considerably. He has always been able to weight bear with minimal pain.     Handedness: right-handed  Sport played:      Level:          Ray also participates in baseball.  Pain        ROS              Objective:       General: Ray is well-developed, well-nourished, appears stated age, in no acute distress, alert and oriented to time, place and person.         General Musculoskeletal Exam   Gait: normal     Right Ankle/Foot Exam   Right ankle exam is normal.    Left Ankle/Foot Exam     Inspection  Bruising: Ankle - present   Effusion: Ankle - present     Pain   The patient exhibits pain of the anterior talofibular ligament.    Swelling   The patient is swollen on the anterior talofibular ligament, calcaneofibular ligament and lateral malleolus.    Tenderness   The patient is tender to palpation of the lateral malleolus.    Muscle Strength   The patient has normal left ankle strength.    Tests   Anterior drawer: negative  Varus tilt: negative  External Rotation Test: negative  Squeeze Test: absent    Other   Sensation: normal              Assessment:     Status: AT - Cleared to Exert    Date Seen:  12/27/23    Date of Injury:  12/21/23    Date Out:  n/a    Date Cleared:  12/27/23      Plan:       1. Compression, strengthening as tolerated  2. Physician Referral: no  3. ED Referral: no  4. Parent/Guardian Notified: No  5. All questions were answered, ath. will contact me for questions or concerns in  the interim.  6.         Eligible to use School Insurance: No, not a school related injury    RAY completed:    RAY completed:    [x] INJURY TREATMENT     []MAINTENANCE    DATE OF SERVICE: 1/11/24   INJURY/CONDITON: left ankle sprain    TRAE reports feeling better and having no pain. He states he has limited ROM. TRAE received the selected modalities after being cleared for contradictions.  TRAE received education on potenital side effects of the selected modalities and agreed to treatment.      THERAPEUTIC EXERCISES:    Stretching  Cardio Rehab   [x]Stretching - Active  []Cardio - Bike [x]Rehab - Ankle/Foot   []Stretching - Dynamic  []Cardio - Elliptical []Rehab - Knee   []Stretching - Passive  []Cardio - Jog/Run []Rehab - Hip   []Stretching - PNF  []Cardio - Treadmill []Rehab - Wrist/Hand   []Stretching - Static  []Cardio - Upper Body Ergometer []Rehab - Elbow     []Cardio - Walk []Rehab - Shoulder      []Rehab - Neck/Spine      []Rehab - Back      []Rehab - Other     Comment:     Exercise Reps/Sets/Time Weight/Band   4 way ankle 3x10 green   SL calf raise 3x10    Calf stretch [id number]    Clams 3x10 blue   SL Balance [id number] airex                              Comment:    Massage Duration  (Mins) Add. Tx Parameters / Comment   []Massage - IASTM     [x]Massage - Therapeutic 5 Milking massage   []Myofascial/Active Release      []Cupping         [x] INJURY TREATMENT    []MAINTENANCE  DATE OF SERVICE: 1/10/24   INJURY/CONDITON: left ankle sprain    TRAE reports feeling better and having no pain. He states he has limited ROM. TRAE received the selected modalities after being cleared for contradictions.  TRAE received education on potenital side effects of the selected modalities and agreed to treatment.      THERAPEUTIC EXERCISES:    Stretching  Cardio Rehab   [x]Stretching - Active  []Cardio - Bike [x]Rehab - Ankle/Foot   []Stretching - Dynamic  []Cardio - Elliptical []Rehab - Knee   []Stretching - Passive  []Cardio - Jog/Run []Rehab - Hip   []Stretching - PNF  []Cardio - Treadmill []Rehab - Wrist/Hand   []Stretching - Static  []Cardio - Upper Body Ergometer  []Rehab - Elbow     []Cardio - Walk []Rehab - Shoulder      []Rehab - Neck/Spine      []Rehab - Back      []Rehab - Other     Comment:     Exercise Reps/Sets/Time Weight/Band   4 way ankle 3x10 green   SL calf raise 3x10    Calf stretch [id number]    Clams 3x10 blue   SL Balance with toss  [id number] airex   Ankle Mobs                           Comment:    Massage Duration  (Mins) Add. Tx Parameters / Comment   []Massage - IASTM     [x]Massage - Therapeutic 5 Milking massage   []Myofascial/Active Release      []Cupping                  TRAE completed:    [x] INJURY TREATMENT    []MAINTENANCE  DATE OF SERVICE: 1/09/24   INJURY/CONDITON: left ankle sprain    TRAE reports feeling better and having no pain. He states he has limited ROM. TRAE received the selected modalities after being cleared for contradictions.  TRAE received education on potenital side effects of the selected modalities and agreed to treatment.      THERAPEUTIC EXERCISES:    Stretching  Cardio Rehab   [x]Stretching - Active  []Cardio - Bike [x]Rehab - Ankle/Foot   []Stretching - Dynamic  []Cardio - Elliptical []Rehab - Knee   []Stretching - Passive  []Cardio - Jog/Run []Rehab - Hip   []Stretching - PNF  []Cardio - Treadmill []Rehab - Wrist/Hand   []Stretching - Static  []Cardio - Upper Body Ergometer []Rehab - Elbow     []Cardio - Walk []Rehab - Shoulder      []Rehab - Neck/Spine      []Rehab - Back      []Rehab - Other     Comment:     Exercise Reps/Sets/Time Weight/Band   4 way ankle 3x10 green   SL calf raise 3x10    Calf stretch [id number]    Clams 3x10 blue   SL Balance [id number] airex                              Comment:    Massage Duration  (Mins) Add. Tx Parameters / Comment   []Massage - IASTM     [x]Massage - Therapeutic 5 Milking massage   []Myofascial/Active Release      []Cupping

## 2024-01-12 NOTE — PROGRESS NOTES
Subjective:       Chief Complaint: Ray Lozano is a 23 y.o. male student at [employer] who had no chief complaint listed for this encounter.    HPI    ROS              Objective:       General: Ray is well-developed, well-nourished, appears stated age, in no acute distress, alert and oriented to time, place and person.     AT Session          Assessment:     Status: F - Full Participation    Date Seen: 01/11/24    Date of Injury: N/a    Date Out: N/a   Date Cleared: N.a    Plan:       1. Continue to work on Strength and ROM   2. Physician Referral: no  3. ED Referral: no  4. Parent/Guardian Notified: No  5. All questions were answered, ath. will contact me for questions or concerns in  the interim.  6.         Eligible to use School Insurance: No, not a school related injury

## 2024-01-17 ENCOUNTER — ATHLETIC TRAINING SESSION (OUTPATIENT)
Dept: SPORTS MEDICINE | Facility: CLINIC | Age: 24
End: 2024-01-17
Payer: COMMERCIAL

## 2024-01-17 DIAGNOSIS — M25.572 ACUTE LEFT ANKLE PAIN: Primary | ICD-10-CM

## 2024-01-17 NOTE — PROGRESS NOTES
Subjective:       Chief Complaint: Ray Lozano is a 23 y.o. male student at [employer] who had no chief complaint listed for this encounter.    Ray reports that on 12/21/23 he stepped off a step and rolled his ankle. He reported hearing and feeling a pop. He used a compression wrap and was icing it in the mean time. Swelling has gone down considerably. He has always been able to weight bear with minimal pain.     Handedness: right-handed  Sport played:      Level:          Ray also participates in baseball.  Pain        ROS              Objective:       General: Ray is well-developed, well-nourished, appears stated age, in no acute distress, alert and oriented to time, place and person.         General Musculoskeletal Exam   Gait: normal     Right Ankle/Foot Exam   Right ankle exam is normal.    Left Ankle/Foot Exam     Inspection  Bruising: Ankle - present   Effusion: Ankle - present     Pain   The patient exhibits pain of the anterior talofibular ligament.    Swelling   The patient is swollen on the anterior talofibular ligament, calcaneofibular ligament and lateral malleolus.    Tenderness   The patient is tender to palpation of the lateral malleolus.    Muscle Strength   The patient has normal left ankle strength.    Tests   Anterior drawer: negative  Varus tilt: negative  External Rotation Test: negative  Squeeze Test: absent    Other   Sensation: normal              Assessment:     Status: AT - Cleared to Exert    Date Seen: 1/17/24    Date of Injury:  12/21/23    Date Out:  n/a    Date Cleared:  12/27/23      Plan:       1. Compression, strengthening as tolerated  2. Physician Referral: no  3. ED Referral: no  4. Parent/Guardian Notified: No  5. All questions were answered, ath. will contact me for questions or concerns in  the interim.  6.         Eligible to use School Insurance: No, not a school related injury      RAY completed:     INJURY TREATMENT    [x]MAINTENANCE    DATE OF SERVICE:  1/19/24   INJURY/CONDITON: left ankle sprain    Athlete came to [facility] for taping pre px.     []  THERAPEUTIC EXERCISES:    Stretching  Cardio Rehab   [x]Stretching - Active  []Cardio - Bike [x]Rehab - Ankle/Foot   []Stretching - Dynamic  []Cardio - Elliptical []Rehab - Knee   []Stretching - Passive  []Cardio - Jog/Run []Rehab - Hip   []Stretching - PNF  []Cardio - Treadmill []Rehab - Wrist/Hand   []Stretching - Static  []Cardio - Upper Body Ergometer []Rehab - Elbow     []Cardio - Walk []Rehab - Shoulder      []Rehab - Neck/Spine      []Rehab - Back      []Rehab - Other     Comment:     Exercise Reps/Sets/Time Weight/Band   4 way ankle 3x10 green   SL calf raise 3x10    Calf stretch [id number]    Clams 3x10 blue   SL Balance [id number] airex                              Comment:    Massage Duration  (Mins) Add. Tx Parameters / Comment   []Massage - IASTM     []Massage - Therapeutic     []Myofascial/Active Release      []Cupping          INJURY TREATMENT    [x]MAINTENANCE    DATE OF SERVICE: 1/18/24   INJURY/CONDITON: left ankle sprain    TRAE reports feeling better and having no pain. He states he has limited ROM. TRAE received the selected modalities after being cleared for contradictions.  TRAE received education on potenital side effects of the selected modalities and agreed to treatment.    []  THERAPEUTIC EXERCISES:    Stretching  Cardio Rehab   [x]Stretching - Active  []Cardio - Bike [x]Rehab - Ankle/Foot   []Stretching - Dynamic  []Cardio - Elliptical []Rehab - Knee   []Stretching - Passive  []Cardio - Jog/Run []Rehab - Hip   []Stretching - PNF  []Cardio - Treadmill []Rehab - Wrist/Hand   []Stretching - Static  []Cardio - Upper Body Ergometer []Rehab - Elbow     []Cardio - Walk []Rehab - Shoulder      []Rehab - Neck/Spine      []Rehab - Back      []Rehab - Other     Comment:     Exercise Reps/Sets/Time Weight/Band   4 way ankle 3x10 green   SL calf raise 3x10    Calf stretch [id number]    Clams 3x10 blue   SL  Balance [id number] airex                              Comment:    Massage Duration  (Mins) Add. Tx Parameters / Comment   []Massage - IASTM     [x]Massage - Therapeutic 5 Milking massage   []Myofascial/Active Release      []Cupping           [] INJURY TREATMENT    [x]MAINTENANCE    DATE OF SERVICE: 1/17/24   INJURY/CONDITON: left ankle sprain    TRAE reports feeling better and having no pain. He states he has limited ROM. TRAE received the selected modalities after being cleared for contradictions.  TRAE received education on potenital side effects of the selected modalities and agreed to treatment.      THERAPEUTIC EXERCISES:    Stretching  Cardio Rehab   [x]Stretching - Active  []Cardio - Bike [x]Rehab - Ankle/Foot   []Stretching - Dynamic  []Cardio - Elliptical []Rehab - Knee   []Stretching - Passive  []Cardio - Jog/Run []Rehab - Hip   []Stretching - PNF  []Cardio - Treadmill []Rehab - Wrist/Hand   []Stretching - Static  []Cardio - Upper Body Ergometer []Rehab - Elbow     []Cardio - Walk []Rehab - Shoulder      []Rehab - Neck/Spine      []Rehab - Back      []Rehab - Other     Comment:     Exercise Reps/Sets/Time Weight/Band   4 way ankle 3x10 green   SL calf raise 3x10    Calf stretch [id number]    Clams 3x10 blue   SL Balance [id number] airex                              Comment:    Massage Duration  (Mins) Add. Tx Parameters / Comment   []Massage - IASTM     [x]Massage - Therapeutic 5 Milking massage   []Myofascial/Active Release      []Cupping         [x] INJURY TREATMENT    []MAINTENANCE

## 2024-01-22 ENCOUNTER — ATHLETIC TRAINING SESSION (OUTPATIENT)
Dept: SPORTS MEDICINE | Facility: CLINIC | Age: 24
End: 2024-01-22
Payer: COMMERCIAL

## 2024-01-22 NOTE — PROGRESS NOTES
Subjective:       Chief Complaint: Ray Lozano is a 23 y.o. male student at [employer] who had no chief complaint listed for this encounter.    Ray reports that on 12/21/23 he stepped off a step and rolled his ankle. He reported hearing and feeling a pop. He used a compression wrap and was icing it in the mean time. Swelling has gone down considerably. He has always been able to weight bear with minimal pain.     Handedness: right-handed  Sport played:      Level:          Ray also participates in baseball.  Pain        ROS              Objective:       General: Ray is well-developed, well-nourished, appears stated age, in no acute distress, alert and oriented to time, place and person.         General Musculoskeletal Exam   Gait: normal     Right Ankle/Foot Exam   Right ankle exam is normal.    Left Ankle/Foot Exam     Inspection  Bruising: Ankle - present   Effusion: Ankle - present     Pain   The patient exhibits pain of the anterior talofibular ligament.    Swelling   The patient is swollen on the anterior talofibular ligament, calcaneofibular ligament and lateral malleolus.    Tenderness   The patient is tender to palpation of the lateral malleolus.    Muscle Strength   The patient has normal left ankle strength.    Tests   Anterior drawer: negative  Varus tilt: negative  External Rotation Test: negative  Squeeze Test: absent    Other   Sensation: normal              Assessment:     Status: AT - Cleared to Exert    Date Seen: 1/17/24    Date of Injury:  12/21/23    Date Out:  n/a    Date Cleared:  12/27/23      Plan:       1. Compression, strengthening as tolerated  2. Physician Referral: no  3. ED Referral: no  4. Parent/Guardian Notified: No  5. All questions were answered, ath. will contact me for questions or concerns in  the interim.  6.         Eligible to use School Insurance: No, not a school related injury      RAY completed:      [] INJURY TREATMENT    [x]MAINTENANCE    DATE OF SERVICE:  1/27/24   INJURY/CONDITON: left ankle sprain    TRAE reports feeling better and having no pain. He states he has limited ROM. TRAE received the selected modalities after being cleared for contradictions.  TRAE received education on potenital side effects of the selected modalities and agreed to treatment.      THERAPEUTIC EXERCISES:    Stretching  Cardio Rehab   [x]Stretching - Active  []Cardio - Bike [x]Rehab - Ankle/Foot   []Stretching - Dynamic  []Cardio - Elliptical []Rehab - Knee   []Stretching - Passive  []Cardio - Jog/Run []Rehab - Hip   []Stretching - PNF  []Cardio - Treadmill []Rehab - Wrist/Hand   []Stretching - Static  []Cardio - Upper Body Ergometer []Rehab - Elbow     []Cardio - Walk []Rehab - Shoulder      []Rehab - Neck/Spine      []Rehab - Back      []Rehab - Other     Comment:     Exercise Reps/Sets/Time Weight/Band   4 way ankle 3x10 green                                                  Comment: Athlete taped for PX     Massage Duration  (Mins) Add. Tx Parameters / Comment   []Massage - IASTM     []Massage - Therapeutic     []Myofascial/Active Release      []Cupping             [] INJURY TREATMENT    [x]MAINTENANCE    DATE OF SERVICE: 1/25/24   INJURY/CONDITON: left ankle sprain    TRAE reports feeling better and having no pain. He states he has limited ROM. TRAE received the selected modalities after being cleared for contradictions.  TRAE received education on potenital side effects of the selected modalities and agreed to treatment.      THERAPEUTIC EXERCISES:    Stretching  Cardio Rehab   [x]Stretching - Active  []Cardio - Bike [x]Rehab - Ankle/Foot   []Stretching - Dynamic  []Cardio - Elliptical []Rehab - Knee   [x]Stretching - Passive  []Cardio - Jog/Run []Rehab - Hip   []Stretching - PNF  []Cardio - Treadmill []Rehab - Wrist/Hand   []Stretching - Static  []Cardio - Upper Body Ergometer []Rehab - Elbow     []Cardio - Walk []Rehab - Shoulder      []Rehab - Neck/Spine      []Rehab  - Back      []Rehab - Other     Comment:     Exercise Reps/Sets/Time Weight/Band   4 way ankle 3x10 green   SL calf raise 3x10    Calf stretch [id number]    Clams 3x10 blue   SL Balance [id number] airex                              Comment:    Massage Duration  (Mins) Add. Tx Parameters / Comment   []Massage - IASTM     [x]Massage - Therapeutic 5 Milking massage   []Myofascial/Active Release      []Cupping           [] INJURY TREATMENT    [x]MAINTENANCE    DATE OF SERVICE: 1/23/24   INJURY/CONDITON: left ankle sprain    TRAE reports feeling better and having no pain. He states he has limited ROM. TRAE received the selected modalities after being cleared for contradictions.  TRAE received education on potenital side effects of the selected modalities and agreed to treatment.      THERAPEUTIC EXERCISES:    Stretching  Cardio Rehab   [x]Stretching - Active  []Cardio - Bike [x]Rehab - Ankle/Foot   []Stretching - Dynamic  []Cardio - Elliptical []Rehab - Knee   [x]Stretching - Passive  []Cardio - Jog/Run []Rehab - Hip   []Stretching - PNF  []Cardio - Treadmill []Rehab - Wrist/Hand   []Stretching - Static  []Cardio - Upper Body Ergometer []Rehab - Elbow     []Cardio - Walk []Rehab - Shoulder      []Rehab - Neck/Spine      []Rehab - Back      []Rehab - Other     Comment:     Exercise Reps/Sets/Time Weight/Band   4 way ankle 3x10 green   SL calf raise 3x10    Calf stretch [id number]    Clams 3x10 blue   SL Balance [id number] airex                              Comment:    Massage Duration  (Mins) Add. Tx Parameters / Comment   []Massage - IASTM     [x]Massage - Therapeutic 5 Milking massage   []Myofascial/Active Release      []Cupping             [] INJURY TREATMENT    [x]MAINTENANCE    DATE OF SERVICE: 1/22/24   INJURY/CONDITON: left ankle sprain    TRAE reports feeling better and having no pain. He states he has limited ROM. TRAE received the selected modalities after being cleared for contradictions.  TRAE received  education on potenital side effects of the selected modalities and agreed to treatment.      THERAPEUTIC EXERCISES:    Stretching  Cardio Rehab   [x]Stretching - Active  []Cardio - Bike [x]Rehab - Ankle/Foot   []Stretching - Dynamic  []Cardio - Elliptical []Rehab - Knee   [x]Stretching - Passive  []Cardio - Jog/Run []Rehab - Hip   []Stretching - PNF  []Cardio - Treadmill []Rehab - Wrist/Hand   []Stretching - Static  []Cardio - Upper Body Ergometer []Rehab - Elbow     []Cardio - Walk []Rehab - Shoulder      []Rehab - Neck/Spine      []Rehab - Back      []Rehab - Other     Comment:     Exercise Reps/Sets/Time Weight/Band   4 way ankle 3x10 green   SL calf raise 3x10    Calf stretch [id number]    Clams 3x10 blue   SL Balance [id number] airex                              Comment:    Massage Duration  (Mins) Add. Tx Parameters / Comment   []Massage - IASTM     [x]Massage - Therapeutic 5 Milking massage   []Myofascial/Active Release      []Cupping          Examination positive for extraction noted to tooth #19 to small what appears to be tooth remnant noted.  No bleeding to the gum, no pus and no laceration to gum

## 2024-01-30 ENCOUNTER — ATHLETIC TRAINING SESSION (OUTPATIENT)
Dept: SPORTS MEDICINE | Facility: CLINIC | Age: 24
End: 2024-01-30
Payer: COMMERCIAL

## 2024-01-30 DIAGNOSIS — M25.572 ACUTE LEFT ANKLE PAIN: Primary | ICD-10-CM

## 2024-01-30 NOTE — PROGRESS NOTES
Subjective:       Chief Complaint: Ray Lozano is a 23 y.o. male student at [employer] who had no chief complaint listed for this encounter.    Ray reports that on 12/21/23 he stepped off a step and rolled his ankle. He reported hearing and feeling a pop. He used a compression wrap and was icing it in the mean time. Swelling has gone down considerably. He has always been able to weight bear with minimal pain.     Handedness: right-handed  Sport played:      Level:          Ray also participates in baseball.  Pain        ROS              Objective:       General: Ray is well-developed, well-nourished, appears stated age, in no acute distress, alert and oriented to time, place and person.         General Musculoskeletal Exam   Gait: normal     Right Ankle/Foot Exam   Right ankle exam is normal.    Left Ankle/Foot Exam     Inspection  Bruising: Ankle - present   Effusion: Ankle - present     Pain   The patient exhibits pain of the anterior talofibular ligament.    Swelling   The patient is swollen on the anterior talofibular ligament, calcaneofibular ligament and lateral malleolus.    Tenderness   The patient is tender to palpation of the lateral malleolus.    Muscle Strength   The patient has normal left ankle strength.    Tests   Anterior drawer: negative  Varus tilt: negative  External Rotation Test: negative  Squeeze Test: absent    Other   Sensation: normal              Assessment:     Status: AT - Cleared to Exert    Date Seen: 1/17/24    Date of Injury:  12/21/23    Date Out:  n/a    Date Cleared:  12/27/23      Plan:       1. Compression, strengthening as tolerated  2. Physician Referral: no  3. ED Referral: no  4. Parent/Guardian Notified: No  5. All questions were answered, ath. will contact me for questions or concerns in  the interim.  6.         Eligible to use School Insurance: No, not a school related injury      RAY completed:      [] INJURY TREATMENT    [x]MAINTENANCE    DATE OF SERVICE:  1/30/24   INJURY/CONDITON: left ankle sprain    TRAE reports feeling better and having no pain. He states he has limited ROM. TRAE received the selected modalities after being cleared for contradictions.  TRAE received education on potenital side effects of the selected modalities and agreed to treatment.      THERAPEUTIC EXERCISES:    Stretching  Cardio Rehab   [x]Stretching - Active  []Cardio - Bike [x]Rehab - Ankle/Foot   []Stretching - Dynamic  []Cardio - Elliptical []Rehab - Knee   []Stretching - Passive  []Cardio - Jog/Run []Rehab - Hip   [x]Stretching - PNF  []Cardio - Treadmill []Rehab - Wrist/Hand   []Stretching - Static  []Cardio - Upper Body Ergometer []Rehab - Elbow     []Cardio - Walk []Rehab - Shoulder      []Rehab - Neck/Spine      []Rehab - Back      []Rehab - Other     Comment:     Exercise Reps/Sets/Time Weight/Band   4 way ankle 3x10 green   Calve Stretch      Foam Balance      Heel Raise      SLS toss on foam                                 Comment: Athlete taped for PX     Massage Duration  (Mins) Add. Tx Parameters / Comment   []Massage - IASTM     []Massage - Therapeutic     []Myofascial/Active Release      []Cupping

## 2024-01-31 ENCOUNTER — ATHLETIC TRAINING SESSION (OUTPATIENT)
Dept: SPORTS MEDICINE | Facility: CLINIC | Age: 24
End: 2024-01-31
Payer: COMMERCIAL

## 2024-01-31 DIAGNOSIS — M25.511 PAIN IN JOINT OF RIGHT SHOULDER: Primary | ICD-10-CM

## 2024-01-31 NOTE — PROGRESS NOTES
Subjective:       Chief Complaint: Ray Lozano is a 23 y.o. male student at [employer] who had no chief complaint listed for this encounter.    SA c/o shoulder pn! After pitching this weekend.    Handedness: right-handed  Sport played:      Level:          Ray also participates in baseball.      ROS              Objective:       General: Ray is well-developed, well-nourished, appears stated age, in no acute distress, alert and oriented to time, place and person.     AT Session          Assessment:     Status: F - Full Participation    Date Seen:  1/31/24    Date of Injury:  N/a    Date Out:  N/a    Date Cleared:  N/a      Plan:       1. Increase RM/ Build strength   2. Physician Referral: no  3. ED Referral:no  4. Parent/Guardian Notified: No  5. All questions were answered, ath. will contact me for questions or concerns in  the interim.  6.         Eligible to use School Insurance: Yes    RAY completed:        DATE OF SERVICE: 02/03/24  Dominant Arm: Right     RAY Came in for arm care     MODALITIES:    Cryotherapy / Thermotherapy Duration  (Mins) Add. Tx Parameters / Comment   []Cold Tub / Whirlpool (50-60 F)     []Contrast Bath (105-110 F & 50-65 F)     []Game Ready     []Hot Pack     []Hot Tub / Whirlpool ( F)     []Ice Massage     []Ice Pack     []Paraffin Wax (126-130 F)     []Vapocoolant Spray        Comment:       Electrotherapy Duration  (Mins) Add. Tx Parameters / Comment   []Combo     []E-Stim - IFC     []E-Stim - Premod     []E-Stim - South Korean     []E-Stim - TENS     []E-Stim - Other       Comment:      Ultrasound Duty Cycle   (%) Freq.  (Mhz) Intensity   (w/cm2) Duration  (Mins) Add. Tx Parameters / Comment   []Combo        []Phonophoresis     Meds:   []Ultrasound         []Ultrasound and E-Stim          Comment:        Massage Duration  (Mins) Add. Tx Parameters / Comment   [x]Massage - IASTM     []Massage - Scar Tissue     []Massage - Self Administered     [x]Massage - Therapeutic      []Myofascial Release        Comment:      Other Modalities Duration  (Mins)  Add. Tx Parameters / Comment   []Active Release     []Cupping     []Dry Needling     []Intermittent Compression      []Traction      []Other:       Comment:      THERAPEUTIC EXERCISES:    Stretching Rehab Other   []Stretching - PNF []Rehab - Wrist/Hand []Foam Roller []RTP - Concussion Protocol   []Stretching - Static []Rehab - Elbow []Functional Exercises []RTP - Sport Specific    []Rehab - Shoulder []Joint Mobilization []Strengthening Exercises    []Rehab - Neck/Spine []Manual Therapy []Other:     Comment:              Exercise Reps/Sets/Time Weight #                                                       Comment:      Miscellaneous Add. Tx Parameters / Comment   []Compression Wrap    []Support Wrap    []Taping - Preventative    []Taping - Injured Part    []Wound Care    []Other:      Comment:      DATE OF SERVICE: 1/31/24  Dominant Arm: Right     TRAE Came in for arm care     MODALITIES:    Cryotherapy / Thermotherapy Duration  (Mins) Add. Tx Parameters / Comment   []Cold Tub / Whirlpool (50-60 F)     []Contrast Bath (105-110 F & 50-65 F)     []Game Ready     []Hot Pack     []Hot Tub / Whirlpool ( F)     []Ice Massage     []Ice Pack     []Paraffin Wax (126-130 F)     []Vapocoolant Spray        Comment:       Electrotherapy Duration  (Mins) Add. Tx Parameters / Comment   []Combo     []E-Stim - IFC     []E-Stim - Premod     []E-Stim - Indonesian     []E-Stim - TENS     []E-Stim - Other       Comment:      Ultrasound Duty Cycle   (%) Freq.  (Mhz) Intensity   (w/cm2) Duration  (Mins) Add. Tx Parameters / Comment   []Combo        []Phonophoresis     Meds:   []Ultrasound         []Ultrasound and E-Stim          Comment:        Massage Duration  (Mins) Add. Tx Parameters / Comment   [x]Massage - IASTM     []Massage - Scar Tissue     []Massage - Self Administered     [x]Massage - Therapeutic     []Myofascial Release         Comment:      Other Modalities Duration  (Mins)  Add. Tx Parameters / Comment   []Active Release     []Cupping     []Dry Needling     []Intermittent Compression      []Traction      []Other:       Comment:      THERAPEUTIC EXERCISES:    Stretching Rehab Other   []Stretching - PNF []Rehab - Wrist/Hand []Foam Roller []RTP - Concussion Protocol   []Stretching - Static []Rehab - Elbow []Functional Exercises []RTP - Sport Specific    []Rehab - Shoulder []Joint Mobilization []Strengthening Exercises    []Rehab - Neck/Spine []Manual Therapy []Other:     Comment:              Exercise Reps/Sets/Time Weight #                                                       Comment:      Miscellaneous Add. Tx Parameters / Comment   []Compression Wrap    []Support Wrap    []Taping - Preventative    []Taping - Injured Part    []Wound Care    []Other:      Comment:

## 2024-02-07 ENCOUNTER — ATHLETIC TRAINING SESSION (OUTPATIENT)
Dept: SPORTS MEDICINE | Facility: CLINIC | Age: 24
End: 2024-02-07
Payer: COMMERCIAL

## 2024-02-07 DIAGNOSIS — M25.511 PAIN IN JOINT OF RIGHT SHOULDER: Primary | ICD-10-CM

## 2024-02-07 NOTE — PROGRESS NOTES
Subjective:       Chief Complaint: Ray Lozano is a 23 y.o. male student at [employer] who had concerns including Pain of the Right Shoulder.    SA c/o shoulder pn! After pitching this weekend.    Handedness: right-handed  Sport played:      Level:          Ray also participates in baseball.  Pain        ROS              Objective:       General: Ray is well-developed, well-nourished, appears stated age, in no acute distress, alert and oriented to time, place and person.     AT Session          Assessment:     Status: F - Full Participation    Date Seen:  1/31/24    Date of Injury:  N/a    Date Out:  N/a    Date Cleared:  N/a      Plan:       1. Increase RM/ Build strength   2. Physician Referral: no  3. ED Referral:no  4. Parent/Guardian Notified: No  5. All questions were answered, ath. will contact me for questions or concerns in  the interim.  6.         Eligible to use School Insurance: Yes    RAY completed:          DATE OF SERVICE: 02/10/24  Dominant Arm: Right     RAY Came in for arm care     MODALITIES:    Cryotherapy / Thermotherapy Duration  (Mins) Add. Tx Parameters / Comment   []Cold Tub / Whirlpool (50-60 F)     []Contrast Bath (105-110 F & 50-65 F)     []Game Ready     []Hot Pack     []Hot Tub / Whirlpool ( F)     []Ice Massage     []Ice Pack     []Paraffin Wax (126-130 F)     []Vapocoolant Spray        Comment:       Electrotherapy Duration  (Mins) Add. Tx Parameters / Comment   []Combo     []E-Stim - IFC     []E-Stim - Premod     []E-Stim - Greenlandic     []E-Stim - TENS     []E-Stim - Other       Comment:      Ultrasound Duty Cycle   (%) Freq.  (Mhz) Intensity   (w/cm2) Duration  (Mins) Add. Tx Parameters / Comment   []Combo        []Phonophoresis     Meds:   []Ultrasound         []Ultrasound and E-Stim          Comment:        Massage Duration  (Mins) Add. Tx Parameters / Comment   [x]Massage - IASTM     []Massage - Scar Tissue     []Massage - Self Administered     [x]Massage -  Therapeutic     []Myofascial Release        Comment:      Other Modalities Duration  (Mins)  Add. Tx Parameters / Comment   []Active Release     []Cupping     []Dry Needling     []Intermittent Compression      []Traction      []Other:       Comment:      THERAPEUTIC EXERCISES:    Stretching Rehab Other   []Stretching - PNF []Rehab - Wrist/Hand []Foam Roller []RTP - Concussion Protocol   [x]Stretching - Static []Rehab - Elbow []Functional Exercises []RTP - Sport Specific    []Rehab - Shoulder []Joint Mobilization []Strengthening Exercises    []Rehab - Neck/Spine []Manual Therapy []Other:     Comment:              Exercise Reps/Sets/Time Weight #                                                       Comment:      Miscellaneous Add. Tx Parameters / Comment   []Compression Wrap    []Support Wrap    []Taping - Preventative    []Taping - Injured Part    []Wound Care    []Other:      Comment:        DATE OF SERVICE: 02/09/24  Dominant Arm: Right     TRAE Came in for arm care     MODALITIES:    Cryotherapy / Thermotherapy Duration  (Mins) Add. Tx Parameters / Comment   []Cold Tub / Whirlpool (50-60 F)     []Contrast Bath (105-110 F & 50-65 F)     []Game Ready     []Hot Pack     []Hot Tub / Whirlpool ( F)     []Ice Massage     []Ice Pack     []Paraffin Wax (126-130 F)     []Vapocoolant Spray        Comment:       Electrotherapy Duration  (Mins) Add. Tx Parameters / Comment   []Combo     []E-Stim - IFC     []E-Stim - Premod     []E-Stim - Omani     []E-Stim - TENS     []E-Stim - Other       Comment:      Ultrasound Duty Cycle   (%) Freq.  (Mhz) Intensity   (w/cm2) Duration  (Mins) Add. Tx Parameters / Comment   []Combo        []Phonophoresis     Meds:   []Ultrasound         []Ultrasound and E-Stim          Comment:        Massage Duration  (Mins) Add. Tx Parameters / Comment   [x]Massage - IASTM     []Massage - Scar Tissue     []Massage - Self Administered     [x]Massage - Therapeutic     []Myofascial Release         Comment:      Other Modalities Duration  (Mins)  Add. Tx Parameters / Comment   []Active Release     []Cupping     []Dry Needling     []Intermittent Compression      []Traction      []Other:       Comment:      THERAPEUTIC EXERCISES:    Stretching Rehab Other   []Stretching - PNF []Rehab - Wrist/Hand []Foam Roller []RTP - Concussion Protocol   [x]Stretching - Static []Rehab - Elbow []Functional Exercises []RTP - Sport Specific    []Rehab - Shoulder []Joint Mobilization []Strengthening Exercises    []Rehab - Neck/Spine []Manual Therapy []Other:     Comment:              Exercise Reps/Sets/Time Weight #                                                       Comment:      Miscellaneous Add. Tx Parameters / Comment   []Compression Wrap    []Support Wrap    []Taping - Preventative    []Taping - Injured Part    []Wound Care    []Other:      Comment:        DATE OF SERVICE: 02/07/24  Dominant Arm: Right     TREA Came in for arm care     MODALITIES:    Cryotherapy / Thermotherapy Duration  (Mins) Add. Tx Parameters / Comment   []Cold Tub / Whirlpool (50-60 F)     []Contrast Bath (105-110 F & 50-65 F)     []Game Ready     []Hot Pack     []Hot Tub / Whirlpool ( F)     []Ice Massage     []Ice Pack     []Paraffin Wax (126-130 F)     []Vapocoolant Spray        Comment:       Electrotherapy Duration  (Mins) Add. Tx Parameters / Comment   []Combo     []E-Stim - IFC     []E-Stim - Premod     []E-Stim - Kyrgyz     []E-Stim - TENS     []E-Stim - Other       Comment:      Ultrasound Duty Cycle   (%) Freq.  (Mhz) Intensity   (w/cm2) Duration  (Mins) Add. Tx Parameters / Comment   []Combo        []Phonophoresis     Meds:   []Ultrasound         []Ultrasound and E-Stim          Comment:        Massage Duration  (Mins) Add. Tx Parameters / Comment   [x]Massage - IASTM     []Massage - Scar Tissue     []Massage - Self Administered     [x]Massage - Therapeutic     []Myofascial Release        Comment:      Other Modalities  Duration  (Mins)  Add. Tx Parameters / Comment   []Active Release     []Cupping     []Dry Needling     []Intermittent Compression      []Traction      []Other:       Comment:      THERAPEUTIC EXERCISES:    Stretching Rehab Other   []Stretching - PNF []Rehab - Wrist/Hand []Foam Roller []RTP - Concussion Protocol   [x]Stretching - Static []Rehab - Elbow []Functional Exercises []RTP - Sport Specific    []Rehab - Shoulder []Joint Mobilization []Strengthening Exercises    []Rehab - Neck/Spine []Manual Therapy []Other:     Comment:              Exercise Reps/Sets/Time Weight #                                                       Comment:      Miscellaneous Add. Tx Parameters / Comment   []Compression Wrap    []Support Wrap    []Taping - Preventative    []Taping - Injured Part    []Wound Care    []Other:      Comment:

## 2024-02-18 ENCOUNTER — ATHLETIC TRAINING SESSION (OUTPATIENT)
Dept: SPORTS MEDICINE | Facility: CLINIC | Age: 24
End: 2024-02-18
Payer: COMMERCIAL

## 2024-02-18 DIAGNOSIS — M25.511 PAIN IN JOINT OF RIGHT SHOULDER: Primary | ICD-10-CM

## 2024-02-18 NOTE — PROGRESS NOTES
Subjective:       Chief Complaint: Ray Lozano is a 23 y.o. male student at [employer] who had concerns including Pain of the Right Shoulder.    SA c/o shoulder pn! After pitching this weekend.    Handedness: right-handed  Sport played:      Level:          Ray also participates in baseball.  Pain        ROS              Objective:       General: Ray is well-developed, well-nourished, appears stated age, in no acute distress, alert and oriented to time, place and person.     AT Session          Assessment:     Status: F - Full Participation    Date Seen:  1/31/24    Date of Injury:  N/a    Date Out:  N/a    Date Cleared:  N/a      Plan:       1. Increase RM/ Build strength   2. Physician Referral: no  3. ED Referral:no  4. Parent/Guardian Notified: No  5. All questions were answered, ath. will contact me for questions or concerns in  the interim.  6.         Eligible to use School Insurance: Yes    RAY completed:          DATE OF SERVICE: 02/17/24  Dominant Arm: Right     RAY Came in for arm care     MODALITIES:    Cryotherapy / Thermotherapy Duration  (Mins) Add. Tx Parameters / Comment   []Cold Tub / Whirlpool (50-60 F)     []Contrast Bath (105-110 F & 50-65 F)     []Game Ready     []Hot Pack     []Hot Tub / Whirlpool ( F)     []Ice Massage     []Ice Pack     []Paraffin Wax (126-130 F)     []Vapocoolant Spray        Comment:       Electrotherapy Duration  (Mins) Add. Tx Parameters / Comment   []Combo     []E-Stim - IFC     []E-Stim - Premod     []E-Stim - Jamaican     []E-Stim - TENS     []E-Stim - Other       Comment:      Ultrasound Duty Cycle   (%) Freq.  (Mhz) Intensity   (w/cm2) Duration  (Mins) Add. Tx Parameters / Comment   []Combo        []Phonophoresis     Meds:   []Ultrasound         []Ultrasound and E-Stim          Comment:        Massage Duration  (Mins) Add. Tx Parameters / Comment   [x]Massage - IASTM     []Massage - Scar Tissue     []Massage - Self Administered     [x]Massage -  Therapeutic     []Myofascial Release        Comment:      Other Modalities Duration  (Mins)  Add. Tx Parameters / Comment   []Active Release     []Cupping     []Dry Needling     []Intermittent Compression      []Traction      []Other:       Comment:      THERAPEUTIC EXERCISES:    Stretching Rehab Other   []Stretching - PNF []Rehab - Wrist/Hand []Foam Roller []RTP - Concussion Protocol   [x]Stretching - Static []Rehab - Elbow []Functional Exercises []RTP - Sport Specific    []Rehab - Shoulder []Joint Mobilization []Strengthening Exercises    []Rehab - Neck/Spine []Manual Therapy []Other:     Comment:              Exercise Reps/Sets/Time Weight #                                                       Comment:      Miscellaneous Add. Tx Parameters / Comment   []Compression Wrap    []Support Wrap    []Taping - Preventative    []Taping - Injured Part    []Wound Care    []Other:      Comment:

## 2024-02-25 ENCOUNTER — ATHLETIC TRAINING SESSION (OUTPATIENT)
Dept: SPORTS MEDICINE | Facility: CLINIC | Age: 24
End: 2024-02-25
Payer: COMMERCIAL

## 2024-02-25 DIAGNOSIS — M25.511 PAIN IN JOINT OF RIGHT SHOULDER: Primary | ICD-10-CM

## 2024-02-25 NOTE — PROGRESS NOTES
Subjective:       Chief Complaint: Ray Lozano is a 23 y.o. male student at [employer] who had concerns including Pain of the Right Shoulder.    SA c/o shoulder pn! After pitching this weekend.    Handedness: right-handed  Sport played:      Level:          Ray also participates in baseball.  Pain        ROS              Objective:       General: Ray is well-developed, well-nourished, appears stated age, in no acute distress, alert and oriented to time, place and person.     AT Session          Assessment:     Status: F - Full Participation    Date Seen:  1/31/24    Date of Injury:  N/a    Date Out:  N/a    Date Cleared:  N/a      Plan:       1. Increase RM/ Build strength   2. Physician Referral: no  3. ED Referral:no  4. Parent/Guardian Notified: No  5. All questions were answered, ath. will contact me for questions or concerns in  the interim.  6.         Eligible to use School Insurance: Yes    RAY completed:          DATE OF SERVICE: 02/23/24  Dominant Arm: Right     RAY Came in for arm care     MODALITIES:    Cryotherapy / Thermotherapy Duration  (Mins) Add. Tx Parameters / Comment   []Cold Tub / Whirlpool (50-60 F)     []Contrast Bath (105-110 F & 50-65 F)     []Game Ready     []Hot Pack     []Hot Tub / Whirlpool ( F)     []Ice Massage     []Ice Pack     []Paraffin Wax (126-130 F)     []Vapocoolant Spray        Comment:       Electrotherapy Duration  (Mins) Add. Tx Parameters / Comment   []Combo     []E-Stim - IFC     []E-Stim - Premod     []E-Stim - Mosotho     []E-Stim - TENS     []E-Stim - Other       Comment:      Ultrasound Duty Cycle   (%) Freq.  (Mhz) Intensity   (w/cm2) Duration  (Mins) Add. Tx Parameters / Comment   []Combo        []Phonophoresis     Meds:   []Ultrasound         []Ultrasound and E-Stim          Comment:        Massage Duration  (Mins) Add. Tx Parameters / Comment   [x]Massage - IASTM     []Massage - Scar Tissue     []Massage - Self Administered     [x]Massage -  Therapeutic     []Myofascial Release        Comment:      Other Modalities Duration  (Mins)  Add. Tx Parameters / Comment   []Active Release     [x]Cupping  POST CAP / LAT    []Dry Needling     []Intermittent Compression      []Traction      []Other:       Comment:      THERAPEUTIC EXERCISES:    Stretching Rehab Other   []Stretching - PNF []Rehab - Wrist/Hand []Foam Roller []RTP - Concussion Protocol   [x]Stretching - Static []Rehab - Elbow []Functional Exercises []RTP - Sport Specific    []Rehab - Shoulder []Joint Mobilization []Strengthening Exercises    []Rehab - Neck/Spine []Manual Therapy []Other:     Comment:              Exercise Reps/Sets/Time Weight #                                                       Comment:      Miscellaneous Add. Tx Parameters / Comment   []Compression Wrap    []Support Wrap    []Taping - Preventative    []Taping - Injured Part    []Wound Care    []Other:      Comment:

## 2024-03-10 ENCOUNTER — ATHLETIC TRAINING SESSION (OUTPATIENT)
Dept: SPORTS MEDICINE | Facility: CLINIC | Age: 24
End: 2024-03-10
Payer: COMMERCIAL

## 2024-03-10 DIAGNOSIS — M25.511 PAIN IN JOINT OF RIGHT SHOULDER: Primary | ICD-10-CM

## 2024-03-10 NOTE — PROGRESS NOTES
Subjective:       Chief Complaint: Ray Lozano is a 23 y.o. male student at [employer] who had concerns including Pain of the Right Shoulder.    SA c/o shoulder pn! After pitching this weekend.    Handedness: right-handed  Sport played:      Level:          Ray also participates in baseball.  Pain        ROS              Objective:       General: Ray is well-developed, well-nourished, appears stated age, in no acute distress, alert and oriented to time, place and person.     AT Session          Assessment:     Status: F - Full Participation    Date Seen:  1/31/24    Date of Injury:  N/a    Date Out:  N/a    Date Cleared:  N/a      Plan:       1. Increase RM/ Build strength   2. Physician Referral: no  3. ED Referral:no  4. Parent/Guardian Notified: No  5. All questions were answered, ath. will contact me for questions or concerns in  the interim.  6.         Eligible to use School Insurance: Yes    RAY completed:      DATE OF SERVICE: 03/10/24  Dominant Arm: Right     RAY Came in for arm care     MODALITIES:    Cryotherapy / Thermotherapy Duration  (Mins) Add. Tx Parameters / Comment   []Cold Tub / Whirlpool (50-60 F)     []Contrast Bath (105-110 F & 50-65 F)     []Game Ready     []Hot Pack     []Hot Tub / Whirlpool ( F)     []Ice Massage     []Ice Pack     []Paraffin Wax (126-130 F)     []Vapocoolant Spray        Comment:       Electrotherapy Duration  (Mins) Add. Tx Parameters / Comment   []Combo     []E-Stim - IFC     []E-Stim - Premod     []E-Stim - Cypriot     []E-Stim - TENS     []E-Stim - Other       Comment:      Ultrasound Duty Cycle   (%) Freq.  (Mhz) Intensity   (w/cm2) Duration  (Mins) Add. Tx Parameters / Comment   []Combo        []Phonophoresis     Meds:   []Ultrasound         []Ultrasound and E-Stim          Comment:        Massage Duration  (Mins) Add. Tx Parameters / Comment   [x]Massage - IASTM     []Massage - Scar Tissue     []Massage - Self Administered     [x]Massage -  Therapeutic     []Myofascial Release        Comment:      Other Modalities Duration  (Mins)  Add. Tx Parameters / Comment   []Active Release     []Cupping     []Dry Needling     []Intermittent Compression      []Traction      []Other:       Comment:      THERAPEUTIC EXERCISES:    Stretching Rehab Other   []Stretching - PNF []Rehab - Wrist/Hand []Foam Roller []RTP - Concussion Protocol   [x]Stretching - Static []Rehab - Elbow []Functional Exercises []RTP - Sport Specific    []Rehab - Shoulder []Joint Mobilization []Strengthening Exercises    []Rehab - Neck/Spine []Manual Therapy []Other:     Comment:              Exercise Reps/Sets/Time Weight #                                                       Comment:      Miscellaneous Add. Tx Parameters / Comment   []Compression Wrap    []Support Wrap    []Taping - Preventative    []Taping - Injured Part    []Wound Care    []Other:      Comment:

## 2024-03-12 ENCOUNTER — ATHLETIC TRAINING SESSION (OUTPATIENT)
Dept: SPORTS MEDICINE | Facility: CLINIC | Age: 24
End: 2024-03-12
Payer: COMMERCIAL

## 2024-03-12 DIAGNOSIS — M25.511 PAIN IN JOINT OF RIGHT SHOULDER: Primary | ICD-10-CM

## 2024-03-12 NOTE — PROGRESS NOTES
Subjective:       Chief Complaint: Ray Lozano is a 23 y.o. male student at [employer] who had concerns including Pain of the Right Shoulder.    SA c/o shoulder pn! After pitching this weekend.    Handedness: right-handed  Sport played:      Level:          Ray also participates in baseball.  Pain        ROS              Objective:       General: Ray is well-developed, well-nourished, appears stated age, in no acute distress, alert and oriented to time, place and person.     AT Session          Assessment:     Status: F - Full Participation    Date Seen:  1/31/24    Date of Injury:  N/a    Date Out:  N/a    Date Cleared:  N/a      Plan:       1. Increase RM/ Build strength   2. Physician Referral: no  3. ED Referral:no  4. Parent/Guardian Notified: No  5. All questions were answered, ath. will contact me for questions or concerns in  the interim.  6.         Eligible to use School Insurance: Yes    RAY completed:      DATE OF SERVICE: 03/12/24  Dominant Arm: Right     RAY Came in for arm care     MODALITIES:    Cryotherapy / Thermotherapy Duration  (Mins) Add. Tx Parameters / Comment   []Cold Tub / Whirlpool (50-60 F)     []Contrast Bath (105-110 F & 50-65 F)     []Game Ready     []Hot Pack     []Hot Tub / Whirlpool ( F)     []Ice Massage     []Ice Pack     []Paraffin Wax (126-130 F)     []Vapocoolant Spray        Comment:       Electrotherapy Duration  (Mins) Add. Tx Parameters / Comment   []Combo     []E-Stim - IFC     []E-Stim - Premod     []E-Stim - Turkish     []E-Stim - TENS     []E-Stim - Other       Comment:      Ultrasound Duty Cycle   (%) Freq.  (Mhz) Intensity   (w/cm2) Duration  (Mins) Add. Tx Parameters / Comment   []Combo        []Phonophoresis     Meds:   []Ultrasound         []Ultrasound and E-Stim          Comment:        Massage Duration  (Mins) Add. Tx Parameters / Comment   [x]Massage - IASTM     []Massage - Scar Tissue     []Massage - Self Administered     [x]Massage -  Therapeutic     []Myofascial Release        Comment:      Other Modalities Duration  (Mins)  Add. Tx Parameters / Comment   []Active Release     [x]Cupping     []Dry Needling     []Intermittent Compression      []Traction      []Other:       Comment:      THERAPEUTIC EXERCISES:    Stretching Rehab Other   []Stretching - PNF []Rehab - Wrist/Hand []Foam Roller []RTP - Concussion Protocol   [x]Stretching - Static []Rehab - Elbow []Functional Exercises []RTP - Sport Specific    []Rehab - Shoulder []Joint Mobilization []Strengthening Exercises    []Rehab - Neck/Spine []Manual Therapy []Other:     Comment:              Exercise Reps/Sets/Time Weight #                                                       Comment:      Miscellaneous Add. Tx Parameters / Comment   []Compression Wrap    []Support Wrap    []Taping - Preventative    []Taping - Injured Part    []Wound Care    []Other:      Comment:       (V5) oriented

## 2024-03-27 ENCOUNTER — ATHLETIC TRAINING SESSION (OUTPATIENT)
Dept: SPORTS MEDICINE | Facility: CLINIC | Age: 24
End: 2024-03-27
Payer: COMMERCIAL

## 2024-03-27 DIAGNOSIS — M25.511 PAIN IN JOINT OF RIGHT SHOULDER: Primary | ICD-10-CM

## 2024-03-27 NOTE — PROGRESS NOTES
Subjective:       Chief Complaint: Ray Lozano is a 23 y.o. male student at [employer] who had concerns including Pain of the Right Shoulder.    SA c/o shoulder pn! After pitching this weekend.    Handedness: right-handed  Sport played:      Level:          Ray also participates in baseball.  Pain        ROS              Objective:       General: Ray is well-developed, well-nourished, appears stated age, in no acute distress, alert and oriented to time, place and person.     AT Session          Assessment:     Status: F - Full Participation    Date Seen:  1/31/24    Date of Injury:  N/a    Date Out:  N/a    Date Cleared:  N/a      Plan:       1. Increase RM/ Build strength   2. Physician Referral: no  3. ED Referral:no  4. Parent/Guardian Notified: No  5. All questions were answered, ath. will contact me for questions or concerns in  the interim.  6.         Eligible to use School Insurance: Yes    RAY completed:      DATE OF SERVICE: 03/27/24  Dominant Arm: Right     RAY Came in for arm care     MODALITIES:    Cryotherapy / Thermotherapy Duration  (Mins) Add. Tx Parameters / Comment   []Cold Tub / Whirlpool (50-60 F)     []Contrast Bath (105-110 F & 50-65 F)     []Game Ready     [x]Hot Pack     []Hot Tub / Whirlpool ( F)     []Ice Massage     []Ice Pack     []Paraffin Wax (126-130 F)     []Vapocoolant Spray        Comment:       Electrotherapy Duration  (Mins) Add. Tx Parameters / Comment   []Combo     []E-Stim - IFC     []E-Stim - Premod     []E-Stim - South Korean     []E-Stim - TENS     []E-Stim - Other       Comment:      Ultrasound Duty Cycle   (%) Freq.  (Mhz) Intensity   (w/cm2) Duration  (Mins) Add. Tx Parameters / Comment   []Combo        []Phonophoresis     Meds:   []Ultrasound         []Ultrasound and E-Stim          Comment:        Massage Duration  (Mins) Add. Tx Parameters / Comment   [x]Massage - IASTM     []Massage - Scar Tissue     []Massage - Self Administered     [x]Massage -  Therapeutic     []Myofascial Release        Comment:      Other Modalities Duration  (Mins)  Add. Tx Parameters / Comment   [x]Active Release     [x]Cupping     []Dry Needling     []Intermittent Compression      []Traction      []Other:       Comment:      THERAPEUTIC EXERCISES:    Stretching Rehab Other   []Stretching - PNF []Rehab - Wrist/Hand []Foam Roller []RTP - Concussion Protocol   [x]Stretching - Static []Rehab - Elbow []Functional Exercises []RTP - Sport Specific    []Rehab - Shoulder []Joint Mobilization []Strengthening Exercises    []Rehab - Neck/Spine []Manual Therapy []Other:     Comment:              Exercise Reps/Sets/Time Weight #                                                       Comment:      Miscellaneous Add. Tx Parameters / Comment   []Compression Wrap    []Support Wrap    []Taping - Preventative    []Taping - Injured Part    []Wound Care    []Other:      Comment:

## 2024-05-11 ENCOUNTER — ATHLETIC TRAINING SESSION (OUTPATIENT)
Dept: SPORTS MEDICINE | Facility: CLINIC | Age: 24
End: 2024-05-11
Payer: COMMERCIAL

## 2024-05-11 DIAGNOSIS — M25.511 PAIN IN JOINT OF RIGHT SHOULDER: Primary | ICD-10-CM

## 2024-05-11 NOTE — PROGRESS NOTES
Subjective:       Chief Complaint: Ray Lozano is a 23 y.o. male student at [employer] [[email or web address]] who had concerns including Pain of the Right Shoulder.    SA c/o shoulder pn! After pitching this weekend.    Handedness: right-handed  Sport played:      Level:          Ray also participates in baseball.  Pain        ROS              Objective:       General: Ray is well-developed, well-nourished, appears stated age, in no acute distress, alert and oriented to time, place and person.     AT Session          Assessment:     Status: F - Full Participation    Date Seen:  1/31/24    Date of Injury:  N/a    Date Out:  N/a    Date Cleared:  N/a      Plan:       1. Increase RM/ Build strength   2. Physician Referral: no  3. ED Referral:no  4. Parent/Guardian Notified: No  5. All questions were answered, ath. will contact me for questions or concerns in  the interim.  6.         Eligible to use School Insurance: Yes    RAY completed:      DATE OF SERVICE: 05/10/24  Dominant Arm: Right     RAY Came in for arm care     MODALITIES:    Cryotherapy / Thermotherapy Duration  (Mins) Add. Tx Parameters / Comment   []Cold Tub / Whirlpool (50-60 F)     []Contrast Bath (105-110 F & 50-65 F)     []Game Ready     []Hot Pack     []Hot Tub / Whirlpool ( F)     []Ice Massage     []Ice Pack     []Paraffin Wax (126-130 F)     []Vapocoolant Spray        Comment:       Electrotherapy Duration  (Mins) Add. Tx Parameters / Comment   []Combo     []E-Stim - IFC     []E-Stim - Premod     []E-Stim - Malian     []E-Stim - TENS     []E-Stim - Other       Comment:      Ultrasound Duty Cycle   (%) Freq.  (Mhz) Intensity   (w/cm2) Duration  (Mins) Add. Tx Parameters / Comment   []Combo        []Phonophoresis     Meds:   []Ultrasound         []Ultrasound and E-Stim          Comment:        Massage Duration  (Mins) Add. Tx Parameters / Comment   [x]Massage - IASTM     []Massage - Scar Tissue     []Massage - Self Administered     [x]Massage -  Therapeutic     []Myofascial Release        Comment:      Other Modalities Duration  (Mins)  Add. Tx Parameters / Comment   []Active Release     []Cupping     []Dry Needling     []Intermittent Compression      []Traction      []Other:       Comment:      THERAPEUTIC EXERCISES:    Stretching Rehab Other   []Stretching - PNF []Rehab - Wrist/Hand []Foam Roller []RTP - Concussion Protocol   [x]Stretching - Static []Rehab - Elbow []Functional Exercises []RTP - Sport Specific    []Rehab - Shoulder []Joint Mobilization []Strengthening Exercises    []Rehab - Neck/Spine []Manual Therapy []Other:     Comment:              Exercise Reps/Sets/Time Weight #                                                       Comment:      Miscellaneous Add. Tx Parameters / Comment   []Compression Wrap    []Support Wrap    []Taping - Preventative    []Taping - Injured Part    []Wound Care    []Other:      Comment:

## 2024-08-10 ENCOUNTER — OFFICE VISIT (OUTPATIENT)
Dept: URGENT CARE | Facility: CLINIC | Age: 24
End: 2024-08-10
Payer: COMMERCIAL

## 2024-08-10 VITALS
OXYGEN SATURATION: 96 % | RESPIRATION RATE: 20 BRPM | HEIGHT: 77 IN | DIASTOLIC BLOOD PRESSURE: 75 MMHG | BODY MASS INDEX: 21.96 KG/M2 | TEMPERATURE: 99 F | WEIGHT: 186 LBS | SYSTOLIC BLOOD PRESSURE: 111 MMHG | HEART RATE: 76 BPM

## 2024-08-10 DIAGNOSIS — K52.9 COLITIS: ICD-10-CM

## 2024-08-10 DIAGNOSIS — R11.10 VOMITING IN ADULT: Primary | ICD-10-CM

## 2024-08-10 PROCEDURE — 96372 THER/PROPH/DIAG INJ SC/IM: CPT | Mod: ,,, | Performed by: FAMILY MEDICINE

## 2024-08-10 PROCEDURE — 99213 OFFICE O/P EST LOW 20 MIN: CPT | Mod: 25,,, | Performed by: FAMILY MEDICINE

## 2024-08-10 RX ORDER — ONDANSETRON 4 MG/1
4 TABLET, ORALLY DISINTEGRATING ORAL EVERY 8 HOURS PRN
Qty: 12 TABLET | Refills: 0 | Status: SHIPPED | OUTPATIENT
Start: 2024-08-10

## 2024-08-10 RX ORDER — METRONIDAZOLE 500 MG/1
500 TABLET ORAL 3 TIMES DAILY
Qty: 21 TABLET | Refills: 0 | Status: SHIPPED | OUTPATIENT
Start: 2024-08-10 | End: 2024-08-17

## 2024-08-10 RX ORDER — PROMETHAZINE HYDROCHLORIDE 25 MG/ML
25 INJECTION, SOLUTION INTRAMUSCULAR; INTRAVENOUS
Status: COMPLETED | OUTPATIENT
Start: 2024-08-10 | End: 2024-08-10

## 2024-08-10 RX ORDER — PANTOPRAZOLE SODIUM 40 MG/1
40 TABLET, DELAYED RELEASE ORAL DAILY
Qty: 7 TABLET | Refills: 3 | Status: SHIPPED | OUTPATIENT
Start: 2024-08-10

## 2024-08-10 RX ORDER — CIPROFLOXACIN 500 MG/1
500 TABLET ORAL EVERY 12 HOURS
Qty: 14 TABLET | Refills: 0 | Status: SHIPPED | OUTPATIENT
Start: 2024-08-10 | End: 2024-08-17

## 2024-08-10 RX ORDER — ACYCLOVIR 50 MG/G
OINTMENT TOPICAL
COMMUNITY
Start: 2024-05-02

## 2024-08-10 RX ADMIN — PROMETHAZINE HYDROCHLORIDE 25 MG: 25 INJECTION, SOLUTION INTRAMUSCULAR; INTRAVENOUS at 06:08

## 2024-08-10 NOTE — PROGRESS NOTES
"Subjective:      Patient ID: Ray Lozano is a 24 y.o. male.    Vitals:  height is 6' 5" (1.956 m) and weight is 84.4 kg (186 lb). His oral temperature is 99.1 °F (37.3 °C). His blood pressure is 111/75 and his pulse is 76. His respiration is 20 and oxygen saturation is 96%.     Chief Complaint: Emesis    25 yo male with cyclical vomiting presents today with vomiting and abdominal cramping. His throat is hurting from the constant vomiting.  He had similar complaints in the past which prompted ED visit on 10/12/22:  abdominal pain and emesis. Diagnosed with colitis and treated with dual antibiotic/ famotidine and antiemetic. Last CT Abdomen 9/1/23 : no acute findings.  Denies drug/ alcohol abuse. Denies contaminated foods. Denies recent travel.    Emesis   This is a new problem. The current episode started yesterday. The problem occurs intermittently. The emesis has an appearance of stomach contents (bloody streaks). There has been no fever. Associated symptoms include abdominal pain (cramping). Pertinent negatives include no arthralgias, chest pain, chills, coughing, decreased urine volume, diarrhea, fever, headaches, myalgias, sweats or URI. He has tried anti-emetic (Zofran taken at home- needs refill) for the symptoms.       Constitution: Negative for chills and fever.   Cardiovascular:  Negative for chest pain, palpitations and sob on exertion.   Respiratory:  Negative for cough, shortness of breath, stridor and wheezing.    Gastrointestinal:  Positive for abdominal pain (cramping) and vomiting. Negative for diarrhea.   Genitourinary:  Negative for dysuria, frequency, urgency and urine decreased.   Musculoskeletal:  Negative for joint pain and muscle ache.   Neurological:  Negative for headaches.      Objective:     Vitals:    08/10/24 1718   BP: 111/75   BP Location: Left arm   Patient Position: Sitting   BP Method: Medium (Automatic)   Pulse: 76   Resp: 20   Temp: 99.1 °F (37.3 °C)   TempSrc: Oral " "  SpO2: 96%   Weight: 84.4 kg (186 lb)   Height: 6' 5" (1.956 m)      Physical Exam   Constitutional: He is oriented to person, place, and time.  Non-toxic appearance. No distress.   HENT:   Head: Atraumatic.   Eyes: Conjunctivae are normal.   Cardiovascular: Normal rate, regular rhythm, normal heart sounds and normal pulses.   Pulmonary/Chest: Effort normal and breath sounds normal.   Abdominal: Bowel sounds are normal. He exhibits no distension and no mass. Soft. There is no abdominal tenderness. There is no rebound and no guarding. No hernia.   Neurological: He is alert and oriented to person, place, and time.   Skin: Skin is not diaphoretic and no rash. Capillary refill takes less than 2 seconds.   Psychiatric: Thought content normal.      Comments: Holding bag with vomit       Assessment:     1. Vomiting in adult    2. Colitis        Plan:       Vomiting in adult  Gastritis, Lu kraft tear supsected  -     promethazine injection 25 mg  -     ondansetron (ZOFRAN-ODT) 4 MG TbDL; Take 1 tablet (4 mg total) by mouth every 8 (eight) hours as needed (nausea/ vomiting).  Dispense: 12 tablet; Refill: 0  -     pantoprazole (PROTONIX) 40 MG tablet; Take 1 tablet (40 mg total) by mouth once daily.  Dispense: 7 tablet; Refill: 3    2. Colitis- similar episode which responded to dual antibiotics which can account for patient's report of abdominal pain  -     ciprofloxacin HCl (CIPRO) 500 MG tablet; Take 1 tablet (500 mg total) by mouth every 12 (twelve) hours. for 7 days  Dispense: 14 tablet; Refill: 0  -     metroNIDAZOLE (FLAGYL) 500 MG tablet; Take 1 tablet (500 mg total) by mouth 3 (three) times daily. for 7 days  Dispense: 21 tablet; Refill: 0      Patient Instructions   Low threshold for Emergency room presentation  Seek immediate care in the emergency room in the event of severe abdominal pain, chest pain, respiratory distress, fever unresponsive to antipyretic, dehydration, loss of consciousness, seizure.       "

## 2024-08-10 NOTE — PATIENT INSTRUCTIONS
Low threshold for Emergency room presentation  Seek immediate care in the emergency room in the event of severe abdominal pain, chest pain, respiratory distress, fever unresponsive to antipyretic, dehydration, loss of consciousness, seizure.

## 2024-11-07 ENCOUNTER — OFFICE VISIT (OUTPATIENT)
Dept: INTERNAL MEDICINE | Facility: CLINIC | Age: 24
End: 2024-11-07
Payer: COMMERCIAL

## 2024-11-07 VITALS
TEMPERATURE: 97 F | BODY MASS INDEX: 23.82 KG/M2 | HEART RATE: 77 BPM | WEIGHT: 201.75 LBS | HEIGHT: 77 IN | RESPIRATION RATE: 16 BRPM | DIASTOLIC BLOOD PRESSURE: 60 MMHG | SYSTOLIC BLOOD PRESSURE: 110 MMHG | OXYGEN SATURATION: 99 %

## 2024-11-07 DIAGNOSIS — Z00.00 PHYSICAL EXAM, ANNUAL: Primary | ICD-10-CM

## 2024-11-07 PROCEDURE — 99999 PR PBB SHADOW E&M-EST. PATIENT-LVL III: CPT | Mod: PBBFAC,,, | Performed by: STUDENT IN AN ORGANIZED HEALTH CARE EDUCATION/TRAINING PROGRAM

## 2024-11-07 PROCEDURE — 99395 PREV VISIT EST AGE 18-39: CPT | Mod: S$GLB,,, | Performed by: STUDENT IN AN ORGANIZED HEALTH CARE EDUCATION/TRAINING PROGRAM

## 2024-11-07 PROCEDURE — 1159F MED LIST DOCD IN RCRD: CPT | Mod: CPTII,S$GLB,, | Performed by: STUDENT IN AN ORGANIZED HEALTH CARE EDUCATION/TRAINING PROGRAM

## 2024-11-07 PROCEDURE — 3074F SYST BP LT 130 MM HG: CPT | Mod: CPTII,S$GLB,, | Performed by: STUDENT IN AN ORGANIZED HEALTH CARE EDUCATION/TRAINING PROGRAM

## 2024-11-07 PROCEDURE — 3008F BODY MASS INDEX DOCD: CPT | Mod: CPTII,S$GLB,, | Performed by: STUDENT IN AN ORGANIZED HEALTH CARE EDUCATION/TRAINING PROGRAM

## 2024-11-07 PROCEDURE — 3078F DIAST BP <80 MM HG: CPT | Mod: CPTII,S$GLB,, | Performed by: STUDENT IN AN ORGANIZED HEALTH CARE EDUCATION/TRAINING PROGRAM

## 2024-11-07 NOTE — PROGRESS NOTES
Subjective:    The following note was written in combination with deep-scribe software and dictation.      Chief Complaint: Establish Care    HPI  Mr. Ray Lozano is a 24 y.o. man with ADHD (no current treatment) and cyclical vomiting syndrome (no longer requiring Protonix/Zofran/Vistaril) presenting as a new patient to establish primary care.          Family, social, surgical Hx reviewed     Health Maintenance:  Due for annual screening labs and routine vaccinations    Past Medical History:   Diagnosis Date    ADHD      Past Surgical History:   Procedure Laterality Date    ULNAR NERVE TRANSPOSITION       Family History   Problem Relation Name Age of Onset    Hypertension Mother      Hyperlipidemia Father      No Known Problems Sister      Cancer Maternal Grandfather      Hypertension Paternal Grandfather      Stroke Paternal Grandfather      Congenital heart disease Paternal Grandfather          hole in the heart    Arrhythmia Neg Hx      Early death Neg Hx      Heart attacks under age 50 Neg Hx      Pacemaker/defibrilator Neg Hx       Social History     Socioeconomic History    Marital status: Single    Number of children: 0   Occupational History    Occupation: Student   Tobacco Use    Smoking status: Former     Types: Cigarettes, Vaping with nicotine    Smokeless tobacco: Current     Types: Chew   Substance and Sexual Activity    Alcohol use: Not Currently    Drug use: Yes     Types: Marijuana    Sexual activity: Yes     Social Drivers of Health     Financial Resource Strain: Low Risk  (11/5/2024)    Overall Financial Resource Strain (CARDIA)     Difficulty of Paying Living Expenses: Not hard at all   Food Insecurity: No Food Insecurity (11/5/2024)    Hunger Vital Sign     Worried About Running Out of Food in the Last Year: Never true     Ran Out of Food in the Last Year: Never true   Physical Activity: Sufficiently Active (11/5/2024)    Exercise Vital Sign     Days of Exercise per Week: 3 days     Minutes of  Exercise per Session: 90 min   Stress: No Stress Concern Present (11/5/2024)    [employer] - Occupational Stress Questionnaire     Feeling of Stress : Only a little   Housing Stability: Unknown (11/5/2024)    Housing Stability Vital Sign     Unable to Pay for Housing in the Last Year: No     Review of patient's allergies indicates:  No Known Allergies  TRAE Lozano had no medications administered during this visit.   Review of Systems   Constitutional:  Negative for appetite change, chills and fever.   HENT: Negative.     Respiratory:  Negative for cough, chest tightness and shortness of breath.    Cardiovascular:  Negative for chest pain, palpitations and leg swelling.   Gastrointestinal:  Negative for abdominal distention, abdominal pain, blood in stool, constipation, diarrhea, nausea and vomiting.   Endocrine: Negative.    Genitourinary:  Negative for difficulty urinating, dysuria, frequency and hematuria.   Musculoskeletal: Negative.    Integumentary:  Negative.   Neurological: Negative.    Psychiatric/Behavioral: Negative.           Objective:      Vitals:    11/07/24 1356   BP: 110/60   Pulse: 77   Resp: 16   Temp: 97.4 °F (36.3 °C)      Physical Exam  Vitals reviewed.   Constitutional:       General: He is not in acute distress.     Appearance: Normal appearance.   HENT:      Head: Normocephalic and atraumatic.      Comments: Facial features are symmetric      Nose: Nose normal. No congestion or rhinorrhea.      Mouth/Throat:      Mouth: Mucous membranes are moist.      Pharynx: Oropharynx is clear. No oropharyngeal exudate or posterior oropharyngeal erythema.   Eyes:      General: No scleral icterus.     Extraocular Movements: Extraocular movements intact.      Conjunctiva/sclera: Conjunctivae normal.   Cardiovascular:      Rate and Rhythm: Normal rate and regular rhythm.      Pulses: Normal pulses.      Heart sounds: Normal heart sounds.   Pulmonary:      Effort: Pulmonary  effort is normal. No respiratory distress.      Breath sounds: Normal breath sounds.   Musculoskeletal:         General: No deformity or signs of injury. Normal range of motion.      Cervical back: Normal range of motion.      Comments: Gait normal    Skin:     General: Skin is warm and dry.      Findings: No rash.   Neurological:      General: No focal deficit present.      Mental Status: He is alert and oriented to person, place, and time. Mental status is at baseline.   Psychiatric:         Mood and Affect: Mood normal.         Behavior: Behavior normal.         Thought Content: Thought content normal.       Current Outpatient Medications on File Prior to Visit   Medication Sig Dispense Refill    acyclovir 5% (ZOVIRAX) 5 % ointment Apply topically.      [DISCONTINUED] hydrOXYzine pamoate (VISTARIL) 25 MG Cap Take 1 capsule (25 mg total) by mouth 4 (four) times daily. (Patient not taking: Reported on 11/7/2024) 60 capsule 2    [DISCONTINUED] ondansetron (ZOFRAN-ODT) 4 MG TbDL Take 1 tablet (4 mg total) by mouth every 8 (eight) hours as needed (nausea/ vomiting). (Patient not taking: Reported on 11/7/2024) 12 tablet 0    [DISCONTINUED] pantoprazole (PROTONIX) 40 MG tablet TAKE 1 TABLET BY MOUTH EVERY DAY (Patient not taking: Reported on 11/7/2024) 30 tablet 5    [DISCONTINUED] pantoprazole (PROTONIX) 40 MG tablet Take 1 tablet (40 mg total) by mouth once daily. 7 tablet 3     No current facility-administered medications on file prior to visit.         Assessment:       1. Physical exam, annual        Plan:       Physical exam, annual  -     CBC Auto Differential; Future; Expected date: 11/07/2024  -     Comprehensive Metabolic Panel; Future; Expected date: 11/07/2024  -     Hemoglobin A1C; Future; Expected date: 11/07/2024  -     Hepatitis C Antibody; Future; Expected date: 11/07/2024  -     HIV 1/2 Ag/Ab (4th Gen); Future; Expected date: 11/07/2024  -     T4; Future; Expected date: 11/07/2024  -     TSH; Future;  Expected date: 11/07/2024  -     Vitamin D; Future; Expected date: 11/07/2024  -     Lipid Panel; Future; Expected date: 11/07/2024   - Primary care assumed   - annual screening labs ordered   - vaccinations deferred    Return to clinic in one year for annual exam or sooner if dictated by labs or illness.

## 2024-11-14 ENCOUNTER — LAB VISIT (OUTPATIENT)
Dept: LAB | Facility: HOSPITAL | Age: 24
End: 2024-11-14
Attending: STUDENT IN AN ORGANIZED HEALTH CARE EDUCATION/TRAINING PROGRAM
Payer: COMMERCIAL

## 2024-11-14 DIAGNOSIS — Z00.00 PHYSICAL EXAM, ANNUAL: ICD-10-CM

## 2024-11-14 LAB
25(OH)D3+25(OH)D2 SERPL-MCNC: 35 NG/ML (ref 30–96)
ALBUMIN SERPL BCP-MCNC: 4.1 G/DL (ref 3.5–5.2)
ALP SERPL-CCNC: 64 U/L (ref 40–150)
ALT SERPL W/O P-5'-P-CCNC: 69 U/L (ref 10–44)
ANION GAP SERPL CALC-SCNC: 11 MMOL/L (ref 8–16)
AST SERPL-CCNC: 32 U/L (ref 10–40)
BASOPHILS # BLD AUTO: 0.05 K/UL (ref 0–0.2)
BASOPHILS NFR BLD: 0.8 % (ref 0–1.9)
BILIRUB SERPL-MCNC: 0.3 MG/DL (ref 0.1–1)
BUN SERPL-MCNC: 16 MG/DL (ref 6–20)
CALCIUM SERPL-MCNC: 9.7 MG/DL (ref 8.7–10.5)
CHLORIDE SERPL-SCNC: 106 MMOL/L (ref 95–110)
CHOLEST SERPL-MCNC: 159 MG/DL (ref 120–199)
CHOLEST/HDLC SERPL: 4 {RATIO} (ref 2–5)
CO2 SERPL-SCNC: 23 MMOL/L (ref 23–29)
CREAT SERPL-MCNC: 1.2 MG/DL (ref 0.5–1.4)
DIFFERENTIAL METHOD BLD: ABNORMAL
EOSINOPHIL # BLD AUTO: 0.2 K/UL (ref 0–0.5)
EOSINOPHIL NFR BLD: 3.4 % (ref 0–8)
ERYTHROCYTE [DISTWIDTH] IN BLOOD BY AUTOMATED COUNT: 13.1 % (ref 11.5–14.5)
EST. GFR  (NO RACE VARIABLE): >60 ML/MIN/1.73 M^2
ESTIMATED AVG GLUCOSE: 91 MG/DL (ref 68–131)
GLUCOSE SERPL-MCNC: 82 MG/DL (ref 70–110)
HBA1C MFR BLD: 4.8 % (ref 4–5.6)
HCT VFR BLD AUTO: 42.9 % (ref 40–54)
HCV AB SERPL QL IA: NORMAL
HDLC SERPL-MCNC: 40 MG/DL (ref 40–75)
HDLC SERPL: 25.2 % (ref 20–50)
HGB BLD-MCNC: 14 G/DL (ref 14–18)
HIV 1+2 AB+HIV1 P24 AG SERPL QL IA: NORMAL
IMM GRANULOCYTES # BLD AUTO: 0.03 K/UL (ref 0–0.04)
IMM GRANULOCYTES NFR BLD AUTO: 0.5 % (ref 0–0.5)
LDLC SERPL CALC-MCNC: 100.4 MG/DL (ref 63–159)
LYMPHOCYTES # BLD AUTO: 2.6 K/UL (ref 1–4.8)
LYMPHOCYTES NFR BLD: 40.3 % (ref 18–48)
MCH RBC QN AUTO: 31.7 PG (ref 27–31)
MCHC RBC AUTO-ENTMCNC: 32.6 G/DL (ref 32–36)
MCV RBC AUTO: 97 FL (ref 82–98)
MONOCYTES # BLD AUTO: 0.5 K/UL (ref 0.3–1)
MONOCYTES NFR BLD: 7.8 % (ref 4–15)
NEUTROPHILS # BLD AUTO: 3 K/UL (ref 1.8–7.7)
NEUTROPHILS NFR BLD: 47.2 % (ref 38–73)
NONHDLC SERPL-MCNC: 119 MG/DL
NRBC BLD-RTO: 0 /100 WBC
PLATELET # BLD AUTO: 268 K/UL (ref 150–450)
PMV BLD AUTO: 10 FL (ref 9.2–12.9)
POTASSIUM SERPL-SCNC: 4.3 MMOL/L (ref 3.5–5.1)
PROT SERPL-MCNC: 6.9 G/DL (ref 6–8.4)
RBC # BLD AUTO: 4.41 M/UL (ref 4.6–6.2)
SODIUM SERPL-SCNC: 140 MMOL/L (ref 136–145)
T4 SERPL-MCNC: 7.1 UG/DL (ref 4.5–11.5)
TRIGL SERPL-MCNC: 93 MG/DL (ref 30–150)
TSH SERPL DL<=0.005 MIU/L-ACNC: 1.4 UIU/ML (ref 0.4–4)
WBC # BLD AUTO: 6.4 K/UL (ref 3.9–12.7)

## 2024-11-14 PROCEDURE — 82306 VITAMIN D 25 HYDROXY: CPT | Performed by: STUDENT IN AN ORGANIZED HEALTH CARE EDUCATION/TRAINING PROGRAM

## 2024-11-14 PROCEDURE — 86803 HEPATITIS C AB TEST: CPT | Performed by: STUDENT IN AN ORGANIZED HEALTH CARE EDUCATION/TRAINING PROGRAM

## 2024-11-14 PROCEDURE — 80061 LIPID PANEL: CPT | Performed by: STUDENT IN AN ORGANIZED HEALTH CARE EDUCATION/TRAINING PROGRAM

## 2024-11-14 PROCEDURE — 85025 COMPLETE CBC W/AUTO DIFF WBC: CPT | Performed by: STUDENT IN AN ORGANIZED HEALTH CARE EDUCATION/TRAINING PROGRAM

## 2024-11-14 PROCEDURE — 36415 COLL VENOUS BLD VENIPUNCTURE: CPT | Mod: PO | Performed by: STUDENT IN AN ORGANIZED HEALTH CARE EDUCATION/TRAINING PROGRAM

## 2024-11-14 PROCEDURE — 87389 HIV-1 AG W/HIV-1&-2 AB AG IA: CPT | Performed by: STUDENT IN AN ORGANIZED HEALTH CARE EDUCATION/TRAINING PROGRAM

## 2024-11-14 PROCEDURE — 83036 HEMOGLOBIN GLYCOSYLATED A1C: CPT | Performed by: STUDENT IN AN ORGANIZED HEALTH CARE EDUCATION/TRAINING PROGRAM

## 2024-11-14 PROCEDURE — 84436 ASSAY OF TOTAL THYROXINE: CPT | Performed by: STUDENT IN AN ORGANIZED HEALTH CARE EDUCATION/TRAINING PROGRAM

## 2024-11-14 PROCEDURE — 84443 ASSAY THYROID STIM HORMONE: CPT | Performed by: STUDENT IN AN ORGANIZED HEALTH CARE EDUCATION/TRAINING PROGRAM

## 2024-11-14 PROCEDURE — 80053 COMPREHEN METABOLIC PANEL: CPT | Performed by: STUDENT IN AN ORGANIZED HEALTH CARE EDUCATION/TRAINING PROGRAM
